# Patient Record
Sex: FEMALE | Race: WHITE | NOT HISPANIC OR LATINO | URBAN - METROPOLITAN AREA
[De-identification: names, ages, dates, MRNs, and addresses within clinical notes are randomized per-mention and may not be internally consistent; named-entity substitution may affect disease eponyms.]

---

## 2017-01-19 ENCOUNTER — HOSPITAL ENCOUNTER (OUTPATIENT)
Dept: RADIOLOGY | Facility: MEDICAL CENTER | Age: 52
End: 2017-01-19
Attending: NURSE PRACTITIONER
Payer: COMMERCIAL

## 2017-01-19 DIAGNOSIS — Z12.39 SCREENING FOR BREAST CANCER: ICD-10-CM

## 2017-01-19 PROCEDURE — 77063 BREAST TOMOSYNTHESIS BI: CPT

## 2017-10-16 ENCOUNTER — OFFICE VISIT (OUTPATIENT)
Dept: MEDICAL GROUP | Facility: MEDICAL CENTER | Age: 52
End: 2017-10-16
Payer: COMMERCIAL

## 2017-10-16 VITALS
SYSTOLIC BLOOD PRESSURE: 140 MMHG | OXYGEN SATURATION: 94 % | TEMPERATURE: 98.1 F | HEIGHT: 67 IN | BODY MASS INDEX: 25.11 KG/M2 | HEART RATE: 84 BPM | RESPIRATION RATE: 16 BRPM | WEIGHT: 160 LBS | DIASTOLIC BLOOD PRESSURE: 82 MMHG

## 2017-10-16 DIAGNOSIS — I10 ESSENTIAL HYPERTENSION: ICD-10-CM

## 2017-10-16 DIAGNOSIS — F41.9 ANXIETY: ICD-10-CM

## 2017-10-16 DIAGNOSIS — Z12.11 SCREEN FOR COLON CANCER: ICD-10-CM

## 2017-10-16 DIAGNOSIS — Z23 INFLUENZA VACCINE NEEDED: ICD-10-CM

## 2017-10-16 DIAGNOSIS — E78.5 DYSLIPIDEMIA: ICD-10-CM

## 2017-10-16 DIAGNOSIS — Z23 NEED FOR PNEUMOCOCCAL VACCINATION: ICD-10-CM

## 2017-10-16 PROCEDURE — 90471 IMMUNIZATION ADMIN: CPT | Performed by: NURSE PRACTITIONER

## 2017-10-16 PROCEDURE — 90686 IIV4 VACC NO PRSV 0.5 ML IM: CPT | Performed by: NURSE PRACTITIONER

## 2017-10-16 PROCEDURE — 90732 PPSV23 VACC 2 YRS+ SUBQ/IM: CPT | Performed by: NURSE PRACTITIONER

## 2017-10-16 PROCEDURE — 99213 OFFICE O/P EST LOW 20 MIN: CPT | Mod: 25 | Performed by: NURSE PRACTITIONER

## 2017-10-16 PROCEDURE — 90472 IMMUNIZATION ADMIN EACH ADD: CPT | Performed by: NURSE PRACTITIONER

## 2017-10-16 RX ORDER — LISINOPRIL AND HYDROCHLOROTHIAZIDE 20; 12.5 MG/1; MG/1
1 TABLET ORAL 2 TIMES DAILY
Qty: 180 TAB | Refills: 3 | Status: SHIPPED | OUTPATIENT
Start: 2017-10-16 | End: 2018-10-29 | Stop reason: SDUPTHER

## 2017-10-16 RX ORDER — LABETALOL 100 MG/1
100 TABLET, FILM COATED ORAL 2 TIMES DAILY
Qty: 180 TAB | Refills: 3 | Status: SHIPPED | OUTPATIENT
Start: 2017-10-16 | End: 2018-10-29 | Stop reason: SDUPTHER

## 2017-10-16 RX ORDER — SIMVASTATIN 20 MG
20 TABLET ORAL EVERY EVENING
Qty: 90 TAB | Refills: 3 | Status: SHIPPED | OUTPATIENT
Start: 2017-10-16 | End: 2018-10-29 | Stop reason: SDUPTHER

## 2017-10-16 ASSESSMENT — PATIENT HEALTH QUESTIONNAIRE - PHQ9: CLINICAL INTERPRETATION OF PHQ2 SCORE: 0

## 2017-10-17 NOTE — PROGRESS NOTES
Subjective:      Elizabeth Suarez is a 51 y.o. female who presents with Medication Refill            HPI Elizabeth Patels here today for yearly physical.      1. Essential hypertension  Patient reports she continues to take her twice a day lisinopril with HCTZ, labetalol twice a day and with this her blood pressure has been controlled. She reports no side effects from the medicine and last blood work showed normal kidney, liver and electrolytes.    2. Anxiety  Patient reports this is well-controlled with her labetalol.    3. Dyslipidemia  Cholesterol levels have been good on low-dose simvastatin in the past but she is due for lab work.    4. Screen for colon cancer  Patient admits she has not yet set up for her colonoscopy.      Social History   Substance Use Topics   • Smoking status: Current Every Day Smoker     Years: 26.00     Types: Cigarettes   • Smokeless tobacco: Never Used      Comment: 1-2 cigs per day,15 yrs   • Alcohol use 8.0 oz/week     16 Glasses of wine per week      Comment: 2-3 per day     Current Outpatient Prescriptions   Medication Sig Dispense Refill   • lisinopril-hydrochlorothiazide (PRINZIDE, ZESTORETIC) 20-12.5 MG per tablet Take 1 Tab by mouth 2 times a day. 180 Tab 3   • simvastatin (ZOCOR) 20 MG Tab Take 1 Tab by mouth every evening. 90 Tab 3   • labetalol (NORMODYNE) 100 MG Tab Take 1 Tab by mouth 2 times a day. 180 Tab 3   • aspirin EC (ECOTRIN) 81 MG TBEC Take 81 mg by mouth every day.       No current facility-administered medications for this visit.      Family History   Problem Relation Age of Onset   • Hypertension Mother      sle   • Diabetes Father    • Heart Failure Father      Past Medical History:   Diagnosis Date   • Hypertension    • Other specified symptom associated with female genital organs        Review of Systems   All other systems reviewed and are negative.         Objective:     /82   Pulse 84   Temp 36.7 °C (98.1 °F)   Resp 16   Ht 1.702 m (5'  "7\")   Wt 72.6 kg (160 lb)   LMP 06/23/2010   SpO2 94%   BMI 25.06 kg/m²      Physical Exam   Constitutional: She is oriented to person, place, and time. She appears well-developed and well-nourished. No distress.   HENT:   Head: Normocephalic and atraumatic.   Right Ear: External ear normal.   Left Ear: External ear normal.   Nose: Nose normal.   Eyes: Right eye exhibits no discharge. Left eye exhibits no discharge.   Neck: Normal range of motion. Neck supple. No thyromegaly present.   Cardiovascular: Normal rate, regular rhythm and normal heart sounds.  Exam reveals no gallop and no friction rub.    No murmur heard.  Pulmonary/Chest: Effort normal and breath sounds normal. She has no wheezes. She has no rales.   Musculoskeletal: She exhibits no edema or tenderness.   Neurological: She is alert and oriented to person, place, and time. She displays normal reflexes.   Skin: Skin is warm and dry. No rash noted. She is not diaphoretic.   Psychiatric: She has a normal mood and affect. Her behavior is normal. Judgment and thought content normal.   Nursing note and vitals reviewed.              Assessment/Plan:     1. Essential hypertension  Patient will do her yearly blood work and follow up if results are abnormal. I recommended that she contact me before her yearly visits so that I can order lab work prior to that visit in the future. Medicines have been refilled.  - COMP METABOLIC PANEL; Future  - CBC WITHOUT DIFFERENTIAL; Future  - lisinopril-hydrochlorothiazide (PRINZIDE, ZESTORETIC) 20-12.5 MG per tablet; Take 1 Tab by mouth 2 times a day.  Dispense: 180 Tab; Refill: 3  - labetalol (NORMODYNE) 100 MG Tab; Take 1 Tab by mouth 2 times a day.  Dispense: 180 Tab; Refill: 3    2. Anxiety  Patient will continue with her labetalol.    3. Dyslipidemia  I will check to see if patient has good cholesterol levels on simvastatin.  - LIPID PROFILE; Future  - simvastatin (ZOCOR) 20 MG Tab; Take 1 Tab by mouth every evening.  " Dispense: 90 Tab; Refill: 3    4. Screen for colon cancer  Patient due for her first colonoscopy.  - REFERRAL TO GI FOR COLONOSCOPY    5. Influenza vaccine needed  I have placed the below orders and discussed them with an approved delegating provider. The MA is performing the below orders under the direction of Dr. harden    - Flu Quad Inj >3 Year Pre-Filled PF    6. Need for pneumococcal vaccination  I have placed the below orders and discussed them with an approved delegating provider. The MA is performing the below orders under the direction of Dr. Harden    - PNEUMOCOCCAL POLYSACCHARIDE VACCINE 23-VALENT =>1YO SQ/IM

## 2017-11-20 ENCOUNTER — HOSPITAL ENCOUNTER (OUTPATIENT)
Dept: LAB | Facility: MEDICAL CENTER | Age: 52
End: 2017-11-20
Attending: NURSE PRACTITIONER
Payer: COMMERCIAL

## 2017-11-20 DIAGNOSIS — I10 ESSENTIAL HYPERTENSION: ICD-10-CM

## 2017-11-20 DIAGNOSIS — E78.5 DYSLIPIDEMIA: ICD-10-CM

## 2017-11-20 LAB
ALBUMIN SERPL BCP-MCNC: 4.5 G/DL (ref 3.2–4.9)
ALBUMIN/GLOB SERPL: 1.6 G/DL
ALP SERPL-CCNC: 39 U/L (ref 30–99)
ALT SERPL-CCNC: 21 U/L (ref 2–50)
ANION GAP SERPL CALC-SCNC: 8 MMOL/L (ref 0–11.9)
AST SERPL-CCNC: 19 U/L (ref 12–45)
BILIRUB SERPL-MCNC: 0.5 MG/DL (ref 0.1–1.5)
BUN SERPL-MCNC: 14 MG/DL (ref 8–22)
CALCIUM SERPL-MCNC: 10.1 MG/DL (ref 8.5–10.5)
CHLORIDE SERPL-SCNC: 107 MMOL/L (ref 96–112)
CHOLEST SERPL-MCNC: 217 MG/DL (ref 100–199)
CO2 SERPL-SCNC: 25 MMOL/L (ref 20–33)
CREAT SERPL-MCNC: 0.7 MG/DL (ref 0.5–1.4)
GFR SERPL CREATININE-BSD FRML MDRD: >60 ML/MIN/1.73 M 2
GLOBULIN SER CALC-MCNC: 2.8 G/DL (ref 1.9–3.5)
GLUCOSE SERPL-MCNC: 79 MG/DL (ref 65–99)
HDLC SERPL-MCNC: 71 MG/DL
LDLC SERPL CALC-MCNC: 126 MG/DL
POTASSIUM SERPL-SCNC: 3.8 MMOL/L (ref 3.6–5.5)
PROT SERPL-MCNC: 7.3 G/DL (ref 6–8.2)
SODIUM SERPL-SCNC: 140 MMOL/L (ref 135–145)
TRIGL SERPL-MCNC: 100 MG/DL (ref 0–149)

## 2017-11-20 PROCEDURE — 80061 LIPID PANEL: CPT

## 2017-11-20 PROCEDURE — 80053 COMPREHEN METABOLIC PANEL: CPT

## 2017-11-20 PROCEDURE — 36415 COLL VENOUS BLD VENIPUNCTURE: CPT

## 2018-08-16 DIAGNOSIS — Z12.11 SCREEN FOR COLON CANCER: ICD-10-CM

## 2018-08-20 ENCOUNTER — HOSPITAL ENCOUNTER (OUTPATIENT)
Dept: RADIOLOGY | Facility: MEDICAL CENTER | Age: 53
End: 2018-08-20
Attending: NURSE PRACTITIONER
Payer: COMMERCIAL

## 2018-08-20 DIAGNOSIS — Z12.31 VISIT FOR SCREENING MAMMOGRAM: ICD-10-CM

## 2018-08-20 PROCEDURE — 77067 SCR MAMMO BI INCL CAD: CPT

## 2018-08-21 ENCOUNTER — NON-PROVIDER VISIT (OUTPATIENT)
Dept: MEDICAL GROUP | Facility: PHYSICIAN GROUP | Age: 53
End: 2018-08-21
Payer: COMMERCIAL

## 2018-08-21 DIAGNOSIS — Z23 NEED FOR TDAP VACCINATION: ICD-10-CM

## 2018-08-21 PROCEDURE — 90715 TDAP VACCINE 7 YRS/> IM: CPT | Performed by: NURSE PRACTITIONER

## 2018-08-21 PROCEDURE — 90471 IMMUNIZATION ADMIN: CPT | Performed by: NURSE PRACTITIONER

## 2018-08-21 NOTE — PROGRESS NOTES
"Elizabeth Suarez is a 52 y.o. female here for a non-provider visit for:   TDAP    Reason for immunization: Overdue/Provider Recommended  Immunization records indicate need for vaccine: Yes, confirmed with Epic and confirmed with [place] [facility]  Minimum interval has been met for this vaccine: Yes  ABN completed: Not Indicated    Order and dose verified by: anat  VIS Dated  2/24/15 was given to patient: Yes  All IAC Questionnaire questions were answered \"No.\"    Patient tolerated injection and no adverse effects were observed or reported: Yes    Pt scheduled for next dose in series: Not Indicated    "

## 2018-10-02 ENCOUNTER — IMMUNIZATION (OUTPATIENT)
Dept: SOCIAL WORK | Facility: CLINIC | Age: 53
End: 2018-10-02
Payer: COMMERCIAL

## 2018-10-02 DIAGNOSIS — Z23 NEED FOR VACCINATION: ICD-10-CM

## 2018-10-02 PROCEDURE — 90471 IMMUNIZATION ADMIN: CPT | Performed by: REGISTERED NURSE

## 2018-10-02 PROCEDURE — 90686 IIV4 VACC NO PRSV 0.5 ML IM: CPT | Performed by: REGISTERED NURSE

## 2018-10-29 DIAGNOSIS — I10 ESSENTIAL HYPERTENSION: ICD-10-CM

## 2018-10-29 DIAGNOSIS — E78.5 DYSLIPIDEMIA: ICD-10-CM

## 2018-10-29 RX ORDER — LISINOPRIL AND HYDROCHLOROTHIAZIDE 20; 12.5 MG/1; MG/1
1 TABLET ORAL 2 TIMES DAILY
Qty: 60 TAB | Refills: 0 | Status: SHIPPED | OUTPATIENT
Start: 2018-10-29 | End: 2018-11-05 | Stop reason: SDUPTHER

## 2018-10-29 RX ORDER — SIMVASTATIN 20 MG
20 TABLET ORAL EVERY EVENING
Qty: 30 TAB | Refills: 0 | Status: SHIPPED | OUTPATIENT
Start: 2018-10-29 | End: 2018-11-05 | Stop reason: SDUPTHER

## 2018-10-29 RX ORDER — LABETALOL 100 MG/1
100 TABLET, FILM COATED ORAL 2 TIMES DAILY
Qty: 60 TAB | Refills: 0 | Status: SHIPPED | OUTPATIENT
Start: 2018-10-29 | End: 2018-11-05 | Stop reason: SDUPTHER

## 2018-11-05 ENCOUNTER — OFFICE VISIT (OUTPATIENT)
Dept: MEDICAL GROUP | Facility: MEDICAL CENTER | Age: 53
End: 2018-11-05
Payer: COMMERCIAL

## 2018-11-05 VITALS
OXYGEN SATURATION: 97 % | RESPIRATION RATE: 16 BRPM | DIASTOLIC BLOOD PRESSURE: 80 MMHG | WEIGHT: 161 LBS | HEIGHT: 67 IN | BODY MASS INDEX: 25.27 KG/M2 | SYSTOLIC BLOOD PRESSURE: 136 MMHG | HEART RATE: 87 BPM

## 2018-11-05 DIAGNOSIS — E78.5 DYSLIPIDEMIA: ICD-10-CM

## 2018-11-05 DIAGNOSIS — I10 ESSENTIAL HYPERTENSION: ICD-10-CM

## 2018-11-05 DIAGNOSIS — K63.5 POLYP OF COLON, UNSPECIFIED PART OF COLON, UNSPECIFIED TYPE: ICD-10-CM

## 2018-11-05 PROCEDURE — 99213 OFFICE O/P EST LOW 20 MIN: CPT | Performed by: NURSE PRACTITIONER

## 2018-11-05 RX ORDER — SIMVASTATIN 20 MG
20 TABLET ORAL EVERY EVENING
Qty: 30 TAB | Refills: 11 | Status: SHIPPED | OUTPATIENT
Start: 2018-11-05 | End: 2019-10-11 | Stop reason: SDUPTHER

## 2018-11-05 RX ORDER — LISINOPRIL AND HYDROCHLOROTHIAZIDE 20; 12.5 MG/1; MG/1
1 TABLET ORAL 2 TIMES DAILY
Qty: 180 TAB | Refills: 3 | Status: SHIPPED | OUTPATIENT
Start: 2018-11-05 | End: 2019-10-11 | Stop reason: SDUPTHER

## 2018-11-05 RX ORDER — LABETALOL 100 MG/1
100 TABLET, FILM COATED ORAL 2 TIMES DAILY
Qty: 180 TAB | Refills: 3 | Status: SHIPPED | OUTPATIENT
Start: 2018-11-05 | End: 2019-10-11 | Stop reason: SDUPTHER

## 2018-11-05 ASSESSMENT — PATIENT HEALTH QUESTIONNAIRE - PHQ9: CLINICAL INTERPRETATION OF PHQ2 SCORE: 0

## 2018-11-06 NOTE — PROGRESS NOTES
Subjective:      Elizabeth Suarez is a 52 y.o. female who presents with Medication Refill (all of them)        CC: Patient here today for yearly follow-up on dyslipidemia and hypertension.    HPI Elizabeth Suarez      1. Dyslipidemia  Patient currently on simvastatin 20 mg and her lab work from a year ago showed very good HDL but continued elevation in LDL and total cholesterol.  She has risk factor of age and hypertension and smoking.    2. Essential hypertension  She reports her blood pressure has been well controlled on lisinopril with HCTZ as well as labetalol which she uses twice a day.  Her last chemistry panel was normal.    3. Polyp of colon, unspecified part of colon, unspecified type  Patient had recent first colonoscopy and was normal except for colon polyps with 5-year follow-up.  Current Outpatient Prescriptions   Medication Sig Dispense Refill   • lisinopril-hydrochlorothiazide (PRINZIDE, ZESTORETIC) 20-12.5 MG per tablet Take 1 Tab by mouth 2 times a day. 180 Tab 3   • simvastatin (ZOCOR) 20 MG Tab Take 1 Tab by mouth every evening. 30 Tab 11   • labetalol (NORMODYNE) 100 MG Tab Take 1 Tab by mouth 2 times a day. 180 Tab 3   • aspirin EC (ECOTRIN) 81 MG TBEC Take 81 mg by mouth every day.       No current facility-administered medications for this visit.      Social History   Substance Use Topics   • Smoking status: Current Every Day Smoker     Years: 26.00     Types: Cigarettes   • Smokeless tobacco: Never Used      Comment: 1-2 cigs per day,15 yrs   • Alcohol use 8.0 oz/week     16 Glasses of wine per week      Comment: 2-3 per day     Past Medical History:   Diagnosis Date   • Hypertension    • Other specified symptom associated with female genital organs      Family History   Problem Relation Age of Onset   • Hypertension Mother         sle   • Diabetes Father    • Heart Failure Father        Review of Systems   All other systems reviewed and are negative.         Objective:     /80  "(BP Location: Right arm, Patient Position: Sitting, BP Cuff Size: Adult)   Pulse 87   Resp 16   Ht 1.702 m (5' 7\")   Wt 73 kg (161 lb)   LMP 06/23/2010   SpO2 97%   BMI 25.22 kg/m²      Physical Exam   Constitutional: She is oriented to person, place, and time. She appears well-developed and well-nourished. No distress.   HENT:   Head: Normocephalic and atraumatic.   Right Ear: External ear normal.   Left Ear: External ear normal.   Nose: Nose normal.   Eyes: Right eye exhibits no discharge. Left eye exhibits no discharge.   Neck: Normal range of motion. Neck supple. No thyromegaly present.   Cardiovascular: Normal rate, regular rhythm and normal heart sounds.  Exam reveals no gallop and no friction rub.    No murmur heard.  Pulmonary/Chest: Effort normal and breath sounds normal. She has no wheezes. She has no rales.   Musculoskeletal: She exhibits no edema or tenderness.   Neurological: She is alert and oriented to person, place, and time. She displays normal reflexes.   Skin: Skin is warm and dry. No rash noted. She is not diaphoretic.   Psychiatric: She has a normal mood and affect. Her behavior is normal. Judgment and thought content normal.   Nursing note and vitals reviewed.              Assessment/Plan:     1. Dyslipidemia  Patient to do lab work and if her LDL is above 100, I am going to recommend switching her to atorvastatin.  - LIPID PROFILE; Future  - simvastatin (ZOCOR) 20 MG Tab; Take 1 Tab by mouth every evening.  Dispense: 30 Tab; Refill: 11    2. Essential hypertension  Blood pressure currently well controlled on her combination medication which she has been on for a while and she is not hypotensive or bradycardic.  - COMP METABOLIC PANEL; Future  - CBC WITH DIFFERENTIAL; Future  - lisinopril-hydrochlorothiazide (PRINZIDE, ZESTORETIC) 20-12.5 MG per tablet; Take 1 Tab by mouth 2 times a day.  Dispense: 180 Tab; Refill: 3  - labetalol (NORMODYNE) 100 MG Tab; Take 1 Tab by mouth 2 times a day. "  Dispense: 180 Tab; Refill: 3    3. Polyp of colon, unspecified part of colon, unspecified type  Patient will be due for repeat colonoscopy in 2023.

## 2018-11-27 ENCOUNTER — HOSPITAL ENCOUNTER (OUTPATIENT)
Dept: LAB | Facility: MEDICAL CENTER | Age: 53
End: 2018-11-27
Attending: NURSE PRACTITIONER
Payer: COMMERCIAL

## 2018-11-27 DIAGNOSIS — E78.5 DYSLIPIDEMIA: ICD-10-CM

## 2018-11-27 DIAGNOSIS — I10 ESSENTIAL HYPERTENSION: ICD-10-CM

## 2018-11-27 LAB
ALBUMIN SERPL BCP-MCNC: 4.6 G/DL (ref 3.2–4.9)
ALBUMIN/GLOB SERPL: 2 G/DL
ALP SERPL-CCNC: 54 U/L (ref 30–99)
ALT SERPL-CCNC: 29 U/L (ref 2–50)
ANION GAP SERPL CALC-SCNC: 10 MMOL/L (ref 0–11.9)
AST SERPL-CCNC: 25 U/L (ref 12–45)
BASOPHILS # BLD AUTO: 0.7 % (ref 0–1.8)
BASOPHILS # BLD: 0.05 K/UL (ref 0–0.12)
BILIRUB SERPL-MCNC: 0.3 MG/DL (ref 0.1–1.5)
BUN SERPL-MCNC: 11 MG/DL (ref 8–22)
CALCIUM SERPL-MCNC: 9.6 MG/DL (ref 8.5–10.5)
CHLORIDE SERPL-SCNC: 105 MMOL/L (ref 96–112)
CHOLEST SERPL-MCNC: 195 MG/DL (ref 100–199)
CO2 SERPL-SCNC: 26 MMOL/L (ref 20–33)
CREAT SERPL-MCNC: 0.68 MG/DL (ref 0.5–1.4)
EOSINOPHIL # BLD AUTO: 0.34 K/UL (ref 0–0.51)
EOSINOPHIL NFR BLD: 4.9 % (ref 0–6.9)
ERYTHROCYTE [DISTWIDTH] IN BLOOD BY AUTOMATED COUNT: 42.5 FL (ref 35.9–50)
FASTING STATUS PATIENT QL REPORTED: NORMAL
GLOBULIN SER CALC-MCNC: 2.3 G/DL (ref 1.9–3.5)
GLUCOSE SERPL-MCNC: 75 MG/DL (ref 65–99)
HCT VFR BLD AUTO: 42.1 % (ref 37–47)
HDLC SERPL-MCNC: 66 MG/DL
HGB BLD-MCNC: 14.4 G/DL (ref 12–16)
IMM GRANULOCYTES # BLD AUTO: 0.01 K/UL (ref 0–0.11)
IMM GRANULOCYTES NFR BLD AUTO: 0.1 % (ref 0–0.9)
LDLC SERPL CALC-MCNC: 101 MG/DL
LYMPHOCYTES # BLD AUTO: 3.03 K/UL (ref 1–4.8)
LYMPHOCYTES NFR BLD: 43.8 % (ref 22–41)
MCH RBC QN AUTO: 33 PG (ref 27–33)
MCHC RBC AUTO-ENTMCNC: 34.2 G/DL (ref 33.6–35)
MCV RBC AUTO: 96.3 FL (ref 81.4–97.8)
MONOCYTES # BLD AUTO: 0.44 K/UL (ref 0–0.85)
MONOCYTES NFR BLD AUTO: 6.4 % (ref 0–13.4)
NEUTROPHILS # BLD AUTO: 3.05 K/UL (ref 2–7.15)
NEUTROPHILS NFR BLD: 44.1 % (ref 44–72)
NRBC # BLD AUTO: 0 K/UL
NRBC BLD-RTO: 0 /100 WBC
PLATELET # BLD AUTO: 216 K/UL (ref 164–446)
PMV BLD AUTO: 10.1 FL (ref 9–12.9)
POTASSIUM SERPL-SCNC: 3.6 MMOL/L (ref 3.6–5.5)
PROT SERPL-MCNC: 6.9 G/DL (ref 6–8.2)
RBC # BLD AUTO: 4.37 M/UL (ref 4.2–5.4)
SODIUM SERPL-SCNC: 141 MMOL/L (ref 135–145)
TRIGL SERPL-MCNC: 140 MG/DL (ref 0–149)
WBC # BLD AUTO: 6.9 K/UL (ref 4.8–10.8)

## 2018-11-27 PROCEDURE — 36415 COLL VENOUS BLD VENIPUNCTURE: CPT

## 2018-11-27 PROCEDURE — 80061 LIPID PANEL: CPT

## 2018-11-27 PROCEDURE — 80053 COMPREHEN METABOLIC PANEL: CPT

## 2018-11-27 PROCEDURE — 85025 COMPLETE CBC W/AUTO DIFF WBC: CPT

## 2019-10-11 ENCOUNTER — OFFICE VISIT (OUTPATIENT)
Dept: MEDICAL GROUP | Facility: MEDICAL CENTER | Age: 54
End: 2019-10-11
Payer: COMMERCIAL

## 2019-10-11 VITALS
HEIGHT: 67 IN | BODY MASS INDEX: 25.43 KG/M2 | TEMPERATURE: 97.4 F | SYSTOLIC BLOOD PRESSURE: 138 MMHG | HEART RATE: 70 BPM | RESPIRATION RATE: 16 BRPM | WEIGHT: 162 LBS | OXYGEN SATURATION: 97 % | DIASTOLIC BLOOD PRESSURE: 72 MMHG

## 2019-10-11 DIAGNOSIS — I10 ESSENTIAL HYPERTENSION: ICD-10-CM

## 2019-10-11 DIAGNOSIS — Z72.0 TOBACCO ABUSE DISORDER: ICD-10-CM

## 2019-10-11 DIAGNOSIS — Z00.00 ROUTINE GENERAL MEDICAL EXAMINATION AT A HEALTH CARE FACILITY: ICD-10-CM

## 2019-10-11 DIAGNOSIS — Z23 INFLUENZA VACCINE NEEDED: ICD-10-CM

## 2019-10-11 DIAGNOSIS — E78.5 DYSLIPIDEMIA: ICD-10-CM

## 2019-10-11 PROCEDURE — 90686 IIV4 VACC NO PRSV 0.5 ML IM: CPT | Performed by: NURSE PRACTITIONER

## 2019-10-11 PROCEDURE — 90471 IMMUNIZATION ADMIN: CPT | Performed by: NURSE PRACTITIONER

## 2019-10-11 PROCEDURE — 99396 PREV VISIT EST AGE 40-64: CPT | Mod: 25 | Performed by: NURSE PRACTITIONER

## 2019-10-11 RX ORDER — LISINOPRIL AND HYDROCHLOROTHIAZIDE 20; 12.5 MG/1; MG/1
1 TABLET ORAL 2 TIMES DAILY
Qty: 180 TAB | Refills: 3 | Status: SHIPPED | OUTPATIENT
Start: 2019-10-11 | End: 2020-11-16 | Stop reason: SDUPTHER

## 2019-10-11 RX ORDER — SIMVASTATIN 20 MG
20 TABLET ORAL EVERY EVENING
Qty: 90 TAB | Refills: 3 | Status: SHIPPED | OUTPATIENT
Start: 2019-10-11 | End: 2020-11-16 | Stop reason: SDUPTHER

## 2019-10-11 RX ORDER — LABETALOL 100 MG/1
100 TABLET, FILM COATED ORAL 2 TIMES DAILY
Qty: 180 TAB | Refills: 3 | Status: SHIPPED | OUTPATIENT
Start: 2019-10-11 | End: 2020-11-16 | Stop reason: SDUPTHER

## 2019-10-11 ASSESSMENT — PATIENT HEALTH QUESTIONNAIRE - PHQ9: CLINICAL INTERPRETATION OF PHQ2 SCORE: 0

## 2019-10-11 NOTE — PROGRESS NOTES
Subjective:      Elizabeth Suarez is a 53 y.o. female who presents with Annual Exam        CC: Patient here today accompanied by her  for annual physical and need of medications for cholesterol and hypertension.    HPI       1. Routine general medical examination at a health care facility  Patient continues to work as a  at the [employer] in [place] where she lives.  She reports no change in status and feels healthy.  She has not needed Pap smear because of previous hysterectomy and is up-to-date on mammograms    2. Dyslipidemia  Patient continues on simvastatin 20 mg which she has taken for quite a while for cholesterol with last HDL at 66 and total cholesterol 195.    3. Essential hypertension  She reports that her blood pressure is been controlled on lisinopril, HCTZ and labetalol.    4. Tobacco abuse disorder  Patient continues to smoke a few cigarettes per day and is not ready to quit.    5. Influenza vaccine needed  Patient due for vaccine  Past Medical History:   Diagnosis Date   • Hypertension    • Other specified symptom associated with female genital organs      Social History     Socioeconomic History   • Marital status:      Spouse name: Not on file   • Number of children: Not on file   • Years of education: Not on file   • Highest education level: Not on file   Occupational History   • Not on file   Social Needs   • Financial resource strain: Not on file   • Food insecurity:     Worry: Not on file     Inability: Not on file   • Transportation needs:     Medical: Not on file     Non-medical: Not on file   Tobacco Use   • Smoking status: Current Every Day Smoker     Years: 26.00     Types: Cigarettes   • Smokeless tobacco: Never Used   • Tobacco comment: 1-2 cigs per day,15 yrs   Substance and Sexual Activity   • Alcohol use: Yes     Alcohol/week: 8.0 oz     Types: 16 Glasses of wine per week     Comment: 2-3 per day   • Drug use: No   • Sexual activity: Yes  "    Partners: Male     Birth control/protection: Surgical   Lifestyle   • Physical activity:     Days per week: Not on file     Minutes per session: Not on file   • Stress: Not on file   Relationships   • Social connections:     Talks on phone: Not on file     Gets together: Not on file     Attends [employer] service: Not on file     Active member of club or organization: Not on file     Attends meetings of clubs or organizations: Not on file     Relationship status: Not on file   • Intimate partner violence:     Fear of current or ex partner: Not on file     Emotionally abused: Not on file     Physically abused: Not on file     Forced sexual activity: Not on file   Other Topics Concern   • Not on file   Social History Narrative   • Not on file     Current Outpatient Medications   Medication Sig Dispense Refill   • lisinopril-hydrochlorothiazide (PRINZIDE, ZESTORETIC) 20-12.5 MG per tablet Take 1 Tab by mouth 2 times a day. 180 Tab 3   • simvastatin (ZOCOR) 20 MG Tab Take 1 Tab by mouth every evening. 90 Tab 3   • labetalol (NORMODYNE) 100 MG Tab Take 1 Tab by mouth 2 times a day. 180 Tab 3   • aspirin EC (ECOTRIN) 81 MG TBEC Take 81 mg by mouth every day.       No current facility-administered medications for this visit.      Family History   Problem Relation Age of Onset   • Hypertension Mother         sle   • Diabetes Father    • Heart Failure Father          Review of Systems   All other systems reviewed and are negative.         Objective:     /72 (BP Location: Right arm, Patient Position: Sitting, BP Cuff Size: Adult)   Pulse 70   Temp 36.3 °C (97.4 °F) (Temporal)   Resp 16   Ht 1.702 m (5' 7\")   Wt 73.5 kg (162 lb)   LMP 06/23/2010   SpO2 97%   BMI 25.37 kg/m²      Physical Exam   Constitutional: She is oriented to person, place, and time. She appears well-developed and well-nourished. No distress.   HENT:   Head: Normocephalic and atraumatic.   Right Ear: External ear normal.   Left Ear: " External ear normal.   Nose: Nose normal.   Eyes: Right eye exhibits no discharge. Left eye exhibits no discharge.   Neck: Normal range of motion. Neck supple. No thyromegaly present.   Cardiovascular: Normal rate, regular rhythm and normal heart sounds. Exam reveals no gallop and no friction rub.   No murmur heard.  Pulmonary/Chest: Effort normal and breath sounds normal. She has no wheezes. She has no rales.   Abdominal: Soft. Bowel sounds are normal. She exhibits no distension and no mass. There is no tenderness. There is no rebound and no guarding.   Musculoskeletal: She exhibits no edema or tenderness.   Neurological: She is alert and oriented to person, place, and time. She displays normal reflexes.   Skin: Skin is warm and dry. No rash noted. She is not diaphoretic.   Psychiatric: She has a normal mood and affect. Her behavior is normal. Judgment and thought content normal.   Nursing note and vitals reviewed.              Assessment/Plan:     1. Routine general medical examination at a health care facility    - Comp Metabolic Panel; Future  - Lipid Profile; Future  - CBC WITHOUT DIFFERENTIAL; Future    2. Dyslipidemia  I will continue patient on simvastatin but explained we may wish to switch to atorvastatin on a future visit.  - Lipid Profile; Future  - simvastatin (ZOCOR) 20 MG Tab; Take 1 Tab by mouth every evening.  Dispense: 90 Tab; Refill: 3    3. Essential hypertension  Blood pressure appears controlled on her 3 medications and she does not feel she needs any changes.  - Comp Metabolic Panel; Future  - lisinopril-hydrochlorothiazide (PRINZIDE, ZESTORETIC) 20-12.5 MG per tablet; Take 1 Tab by mouth 2 times a day.  Dispense: 180 Tab; Refill: 3  - labetalol (NORMODYNE) 100 MG Tab; Take 1 Tab by mouth 2 times a day.  Dispense: 180 Tab; Refill: 3    4. Tobacco abuse disorder  Patient again reminded by myself and her  that she should try quitting all tobacco but she is not ready to do so.    5.  Influenza vaccine needed  I have placed the below orders and discussed them with an approved delegating provider. The MA is performing the below orders under the direction of Dr. Cortes    - Influenza Vaccine Quad Injection (PF)

## 2020-11-16 DIAGNOSIS — E78.5 DYSLIPIDEMIA: ICD-10-CM

## 2020-11-16 DIAGNOSIS — Z00.00 ROUTINE GENERAL MEDICAL EXAMINATION AT A HEALTH CARE FACILITY: ICD-10-CM

## 2020-11-16 DIAGNOSIS — I10 ESSENTIAL HYPERTENSION: ICD-10-CM

## 2020-11-16 RX ORDER — LABETALOL 100 MG/1
100 TABLET, FILM COATED ORAL 2 TIMES DAILY
Qty: 60 TAB | Refills: 0 | Status: SHIPPED | OUTPATIENT
Start: 2020-11-16 | End: 2020-12-14 | Stop reason: SDUPTHER

## 2020-11-16 RX ORDER — LISINOPRIL AND HYDROCHLOROTHIAZIDE 20; 12.5 MG/1; MG/1
1 TABLET ORAL 2 TIMES DAILY
Qty: 60 TAB | Refills: 0 | Status: SHIPPED | OUTPATIENT
Start: 2020-11-16 | End: 2020-12-14 | Stop reason: SDUPTHER

## 2020-11-16 RX ORDER — SIMVASTATIN 20 MG
20 TABLET ORAL EVERY EVENING
Qty: 30 TAB | Refills: 0 | Status: SHIPPED | OUTPATIENT
Start: 2020-11-16 | End: 2020-12-14 | Stop reason: SDUPTHER

## 2020-11-25 ENCOUNTER — HOSPITAL ENCOUNTER (OUTPATIENT)
Dept: LAB | Facility: MEDICAL CENTER | Age: 55
End: 2020-11-25
Attending: NURSE PRACTITIONER
Payer: COMMERCIAL

## 2020-11-25 DIAGNOSIS — Z00.00 ROUTINE GENERAL MEDICAL EXAMINATION AT A HEALTH CARE FACILITY: ICD-10-CM

## 2020-11-25 LAB
ALBUMIN SERPL BCP-MCNC: 4.9 G/DL (ref 3.2–4.9)
ALBUMIN/GLOB SERPL: 2 G/DL
ALP SERPL-CCNC: 58 U/L (ref 30–99)
ALT SERPL-CCNC: 40 U/L (ref 2–50)
ANION GAP SERPL CALC-SCNC: 12 MMOL/L (ref 7–16)
AST SERPL-CCNC: 30 U/L (ref 12–45)
BILIRUB SERPL-MCNC: 0.6 MG/DL (ref 0.1–1.5)
BUN SERPL-MCNC: 7 MG/DL (ref 8–22)
CALCIUM SERPL-MCNC: 10.2 MG/DL (ref 8.5–10.5)
CHLORIDE SERPL-SCNC: 101 MMOL/L (ref 96–112)
CHOLEST SERPL-MCNC: 226 MG/DL (ref 100–199)
CO2 SERPL-SCNC: 24 MMOL/L (ref 20–33)
CREAT SERPL-MCNC: 0.63 MG/DL (ref 0.5–1.4)
ERYTHROCYTE [DISTWIDTH] IN BLOOD BY AUTOMATED COUNT: 42.3 FL (ref 35.9–50)
FASTING STATUS PATIENT QL REPORTED: NORMAL
GLOBULIN SER CALC-MCNC: 2.5 G/DL (ref 1.9–3.5)
GLUCOSE SERPL-MCNC: 82 MG/DL (ref 65–99)
HCT VFR BLD AUTO: 39.9 % (ref 37–47)
HDLC SERPL-MCNC: 107 MG/DL
HGB BLD-MCNC: 14.9 G/DL (ref 12–16)
LDLC SERPL CALC-MCNC: 106 MG/DL
MCH RBC QN AUTO: 37.2 PG (ref 27–33)
MCHC RBC AUTO-ENTMCNC: 37.3 G/DL (ref 33.6–35)
MCV RBC AUTO: 99.5 FL (ref 81.4–97.8)
PLATELET # BLD AUTO: 264 K/UL (ref 164–446)
PMV BLD AUTO: 9.7 FL (ref 9–12.9)
POTASSIUM SERPL-SCNC: 4.1 MMOL/L (ref 3.6–5.5)
PROT SERPL-MCNC: 7.4 G/DL (ref 6–8.2)
RBC # BLD AUTO: 4.01 M/UL (ref 4.2–5.4)
SODIUM SERPL-SCNC: 137 MMOL/L (ref 135–145)
TRIGL SERPL-MCNC: 65 MG/DL (ref 0–149)
WBC # BLD AUTO: 6.5 K/UL (ref 4.8–10.8)

## 2020-11-25 PROCEDURE — 80061 LIPID PANEL: CPT

## 2020-11-25 PROCEDURE — 80053 COMPREHEN METABOLIC PANEL: CPT

## 2020-11-25 PROCEDURE — 85027 COMPLETE CBC AUTOMATED: CPT

## 2020-11-25 PROCEDURE — 36415 COLL VENOUS BLD VENIPUNCTURE: CPT

## 2020-11-30 DIAGNOSIS — D75.89 MACROCYTOSIS: ICD-10-CM

## 2020-12-14 DIAGNOSIS — E78.5 DYSLIPIDEMIA: ICD-10-CM

## 2020-12-14 DIAGNOSIS — I10 ESSENTIAL HYPERTENSION: ICD-10-CM

## 2020-12-14 RX ORDER — SIMVASTATIN 20 MG
20 TABLET ORAL EVERY EVENING
Qty: 30 TAB | Refills: 0 | Status: SHIPPED | OUTPATIENT
Start: 2020-12-14 | End: 2020-12-29 | Stop reason: SDUPTHER

## 2020-12-14 RX ORDER — LISINOPRIL AND HYDROCHLOROTHIAZIDE 20; 12.5 MG/1; MG/1
1 TABLET ORAL 2 TIMES DAILY
Qty: 60 TAB | Refills: 0 | Status: SHIPPED | OUTPATIENT
Start: 2020-12-14 | End: 2020-12-29 | Stop reason: SDUPTHER

## 2020-12-14 RX ORDER — LABETALOL 100 MG/1
100 TABLET, FILM COATED ORAL 2 TIMES DAILY
Qty: 60 TAB | Refills: 0 | Status: SHIPPED | OUTPATIENT
Start: 2020-12-14 | End: 2020-12-29

## 2020-12-22 ENCOUNTER — HOSPITAL ENCOUNTER (OUTPATIENT)
Dept: LAB | Facility: MEDICAL CENTER | Age: 55
End: 2020-12-22
Attending: NURSE PRACTITIONER
Payer: COMMERCIAL

## 2020-12-22 DIAGNOSIS — D75.89 MACROCYTOSIS: ICD-10-CM

## 2020-12-22 LAB
BASOPHILS # BLD AUTO: 0.6 % (ref 0–1.8)
BASOPHILS # BLD: 0.03 K/UL (ref 0–0.12)
EOSINOPHIL # BLD AUTO: 0.21 K/UL (ref 0–0.51)
EOSINOPHIL NFR BLD: 4.4 % (ref 0–6.9)
ERYTHROCYTE [DISTWIDTH] IN BLOOD BY AUTOMATED COUNT: 41.5 FL (ref 35.9–50)
FOLATE SERPL-MCNC: 30 NG/ML
HCT VFR BLD AUTO: 39.7 % (ref 37–47)
HGB BLD-MCNC: 14.7 G/DL (ref 12–16)
IMM GRANULOCYTES # BLD AUTO: 0.01 K/UL (ref 0–0.11)
IMM GRANULOCYTES NFR BLD AUTO: 0.2 % (ref 0–0.9)
IRON SATN MFR SERPL: 35 % (ref 15–55)
IRON SERPL-MCNC: 102 UG/DL (ref 40–170)
LYMPHOCYTES # BLD AUTO: 1.89 K/UL (ref 1–4.8)
LYMPHOCYTES NFR BLD: 39.6 % (ref 22–41)
MCH RBC QN AUTO: 36.8 PG (ref 27–33)
MCHC RBC AUTO-ENTMCNC: 37 G/DL (ref 33.6–35)
MCV RBC AUTO: 99.3 FL (ref 81.4–97.8)
MONOCYTES # BLD AUTO: 0.39 K/UL (ref 0–0.85)
MONOCYTES NFR BLD AUTO: 8.2 % (ref 0–13.4)
NEUTROPHILS # BLD AUTO: 2.24 K/UL (ref 2–7.15)
NEUTROPHILS NFR BLD: 47 % (ref 44–72)
NRBC # BLD AUTO: 0 K/UL
NRBC BLD-RTO: 0 /100 WBC
PLATELET # BLD AUTO: 252 K/UL (ref 164–446)
PMV BLD AUTO: 9.5 FL (ref 9–12.9)
RBC # BLD AUTO: 4 M/UL (ref 4.2–5.4)
TIBC SERPL-MCNC: 290 UG/DL (ref 250–450)
TSH SERPL DL<=0.005 MIU/L-ACNC: 0.92 UIU/ML (ref 0.38–5.33)
UIBC SERPL-MCNC: 188 UG/DL (ref 110–370)
VIT B12 SERPL-MCNC: 778 PG/ML (ref 211–911)
WBC # BLD AUTO: 4.8 K/UL (ref 4.8–10.8)

## 2020-12-22 PROCEDURE — 83550 IRON BINDING TEST: CPT

## 2020-12-22 PROCEDURE — 82607 VITAMIN B-12: CPT

## 2020-12-22 PROCEDURE — 36415 COLL VENOUS BLD VENIPUNCTURE: CPT

## 2020-12-22 PROCEDURE — 83540 ASSAY OF IRON: CPT

## 2020-12-22 PROCEDURE — 84443 ASSAY THYROID STIM HORMONE: CPT

## 2020-12-22 PROCEDURE — 82746 ASSAY OF FOLIC ACID SERUM: CPT

## 2020-12-22 PROCEDURE — 85025 COMPLETE CBC W/AUTO DIFF WBC: CPT

## 2020-12-29 ENCOUNTER — TELEMEDICINE (OUTPATIENT)
Dept: MEDICAL GROUP | Facility: MEDICAL CENTER | Age: 55
End: 2020-12-29
Payer: COMMERCIAL

## 2020-12-29 VITALS
SYSTOLIC BLOOD PRESSURE: 139 MMHG | WEIGHT: 156 LBS | HEIGHT: 66 IN | BODY MASS INDEX: 25.07 KG/M2 | RESPIRATION RATE: 16 BRPM | DIASTOLIC BLOOD PRESSURE: 92 MMHG

## 2020-12-29 DIAGNOSIS — Z12.31 ENCOUNTER FOR SCREENING MAMMOGRAM FOR BREAST CANCER: ICD-10-CM

## 2020-12-29 DIAGNOSIS — I10 ESSENTIAL HYPERTENSION: ICD-10-CM

## 2020-12-29 DIAGNOSIS — D75.89 MACROCYTOSIS: ICD-10-CM

## 2020-12-29 DIAGNOSIS — Z72.0 TOBACCO ABUSE DISORDER: ICD-10-CM

## 2020-12-29 DIAGNOSIS — E78.5 DYSLIPIDEMIA: ICD-10-CM

## 2020-12-29 PROCEDURE — 99213 OFFICE O/P EST LOW 20 MIN: CPT | Mod: 95,CR | Performed by: NURSE PRACTITIONER

## 2020-12-29 RX ORDER — LABETALOL 200 MG/1
200 TABLET, FILM COATED ORAL 2 TIMES DAILY
Qty: 180 TAB | Refills: 3 | Status: SHIPPED | OUTPATIENT
Start: 2020-12-29 | End: 2022-01-06 | Stop reason: SDUPTHER

## 2020-12-29 RX ORDER — SIMVASTATIN 20 MG
20 TABLET ORAL EVERY EVENING
Qty: 90 TAB | Refills: 3 | Status: SHIPPED | OUTPATIENT
Start: 2020-12-29 | End: 2022-01-06 | Stop reason: SDUPTHER

## 2020-12-29 RX ORDER — LISINOPRIL AND HYDROCHLOROTHIAZIDE 20; 12.5 MG/1; MG/1
1 TABLET ORAL 2 TIMES DAILY
Qty: 180 TAB | Refills: 3 | Status: SHIPPED | OUTPATIENT
Start: 2020-12-29 | End: 2022-01-06 | Stop reason: SDUPTHER

## 2020-12-29 ASSESSMENT — FIBROSIS 4 INDEX: FIB4 SCORE: 1.02

## 2020-12-29 ASSESSMENT — PATIENT HEALTH QUESTIONNAIRE - PHQ9: CLINICAL INTERPRETATION OF PHQ2 SCORE: 0

## 2020-12-29 NOTE — PROGRESS NOTES
Virtual Visit: Established Patient   This visit was conducted via Zoom  using secure and encrypted videoconferencing technology. The patient was in a private location in the state of [place].    The patient's identity was confirmed and verbal consent was obtained for this virtual visit.      CC: Patient last seen in October of last year so she was advised that we needed a visit today to refill medicines and review her diagnoses.  She requested a telemedicine visit today from her home.                                                                                                                                      HPI:   Elizabeth Rudolph presents today with the following.    1. Essential hypertension  Patient is currently on a combination of labetalol 100 mg twice a day and lisinopril 20 mg with HCTZ 12.5 mg twice a day.  She states with this her blood pressure is still running above 130/80.  She has had no side effects from medicines.  Liver and kidney functions been good    2. Dyslipidemia  Patient on low-dose simvastatin and her LDL is just above 100 but her HDL is excellent.    3. Macrocytosis  Patient's lab work comes back showing mildly elevated MCV, MCH and MCHC with no anemia.  This is similar to readings in November but lab work from 2018 was normal.  Patient states she feels well and she shows negative for thyroid disease and B12 and folic acid have been normal as well as iron levels.    4. Tobacco abuse disorder  Patient continues to smoke cigarettes    5. Encounter for screening mammogram for breast cancer  Patient due for mammogram      Patient Active Problem List    Diagnosis Date Noted   • Macrocytosis 12/29/2020   • Essential hypertension 08/12/2015   • Anxiety 08/12/2015   • Tobacco abuse disorder 02/20/2013   • S/P hysterectomy 08/15/2012   • Dyslipidemia 02/21/2012       Current Outpatient Medications   Medication Sig Dispense Refill   • labetalol (NORMODYNE) 200 MG Tab Take 1 Tab by mouth 2 times a  "day. 180 Tab 3   • lisinopril-hydrochlorothiazide (PRINZIDE) 20-12.5 MG per tablet Take 1 Tab by mouth 2 times a day. 180 Tab 3   • simvastatin (ZOCOR) 20 MG Tab Take 1 Tab by mouth every evening. 90 Tab 3   • aspirin EC (ECOTRIN) 81 MG TBEC Take 81 mg by mouth every day.       No current facility-administered medications for this visit.          Allergies as of 12/29/2020 - Reviewed 12/29/2020   Allergen Reaction Noted   • Nkda [no known drug allergy]  07/21/2010        ROS:  Denies, chest pain, Shortness of breath, Edema.  No pertinent positives.    /92   Resp 16   Ht 1.676 m (5' 6\")   Wt 70.8 kg (156 lb)   LMP 06/23/2010   BMI 25.18 kg/m²       Physical Exam:  Constitutional: Alert, no distress, well-groomed.  Skin: No rashes in visible areas.  Eye: Round. Conjunctiva clear, lNo icterus.   ENMT: Lips pink without lesions, good dentition, moist mucous membranes. Phonation normal.  Neck: No masses, no thyromegaly. Moves freely without pain.  CV: Pulse as reported by patient  Respiratory: Unlabored respiratory effort, no cough or audible wheeze  Psych: Alert and oriented x3, normal affect and mood.          Assessment and Plan.   55 y.o. female with the following issues.    1. Essential hypertension  I will increase patient's labetalol to 200 mg twice a day to try to get her blood pressure closer to 130/80 without dizziness.  I advised her if her pulse gets too slow or she becomes dizzy, we will need to go back to the overdosage discuss other changes.  - labetalol (NORMODYNE) 200 MG Tab; Take 1 Tab by mouth 2 times a day.  Dispense: 180 Tab; Refill: 3  - lisinopril-hydrochlorothiazide (PRINZIDE) 20-12.5 MG per tablet; Take 1 Tab by mouth 2 times a day.  Dispense: 180 Tab; Refill: 3    2. Dyslipidemia  HDL cholesterol is excellent and above 100 with just slightly elevated LDL  - simvastatin (ZOCOR) 20 MG Tab; Take 1 Tab by mouth every evening.  Dispense: 90 Tab; Refill: 3    3. Macrocytosis  I will have " patient do a hemoglobinopathy profile with recent normal B12, folic acid and TSH with no iron deficiency and no anemia.  - HEMOGLOBINOPATHY PROFILE; Future    4. Tobacco abuse disorder  Patient states she continues to smoke about 2 cigarettes a day    5. Encounter for screening mammogram for breast cancer    - MA-SCREENING MAMMO BILAT W/TOMOSYNTHESIS W/CAD; Future

## 2021-01-05 ENCOUNTER — HOSPITAL ENCOUNTER (OUTPATIENT)
Dept: LAB | Facility: MEDICAL CENTER | Age: 56
End: 2021-01-05
Attending: NURSE PRACTITIONER
Payer: COMMERCIAL

## 2021-01-05 DIAGNOSIS — D75.89 MACROCYTOSIS: ICD-10-CM

## 2021-01-05 PROCEDURE — 36415 COLL VENOUS BLD VENIPUNCTURE: CPT

## 2021-01-05 PROCEDURE — 83021 HEMOGLOBIN CHROMOTOGRAPHY: CPT

## 2021-01-07 LAB
HGB A1 MFR BLD: 97.1 % (ref 95–97.9)
HGB A2 MFR BLD: 2.6 % (ref 2–3.5)
HGB C MFR BLD: 0 % (ref 0–0)
HGB E MFR BLD: 0 % (ref 0–0)
HGB F MFR BLD: 0.3 % (ref 0–2.1)
HGB FRACT BLD ELPH-IMP: NORMAL
HGB OTHER MFR BLD: 0 % (ref 0–0)
HGB S BLD QL SOLY: NORMAL
HGB S MFR BLD: 0 % (ref 0–0)
PATH INTERP BLD-IMP: NORMAL

## 2021-01-12 ENCOUNTER — HOSPITAL ENCOUNTER (OUTPATIENT)
Dept: RADIOLOGY | Facility: MEDICAL CENTER | Age: 56
End: 2021-01-12
Payer: COMMERCIAL

## 2021-01-22 ENCOUNTER — HOSPITAL ENCOUNTER (OUTPATIENT)
Dept: RADIOLOGY | Facility: MEDICAL CENTER | Age: 56
End: 2021-01-22
Attending: NURSE PRACTITIONER
Payer: COMMERCIAL

## 2021-01-22 DIAGNOSIS — Z12.31 ENCOUNTER FOR SCREENING MAMMOGRAM FOR BREAST CANCER: ICD-10-CM

## 2021-01-22 PROCEDURE — 77063 BREAST TOMOSYNTHESIS BI: CPT

## 2022-01-06 ENCOUNTER — OFFICE VISIT (OUTPATIENT)
Dept: MEDICAL GROUP | Facility: PHYSICIAN GROUP | Age: 57
End: 2022-01-06
Payer: COMMERCIAL

## 2022-01-06 VITALS
DIASTOLIC BLOOD PRESSURE: 80 MMHG | HEART RATE: 80 BPM | SYSTOLIC BLOOD PRESSURE: 130 MMHG | WEIGHT: 167.6 LBS | HEIGHT: 66 IN | BODY MASS INDEX: 26.93 KG/M2 | RESPIRATION RATE: 16 BRPM | TEMPERATURE: 96 F | OXYGEN SATURATION: 96 %

## 2022-01-06 DIAGNOSIS — E78.5 DYSLIPIDEMIA: ICD-10-CM

## 2022-01-06 DIAGNOSIS — Z76.89 ENCOUNTER TO ESTABLISH CARE: ICD-10-CM

## 2022-01-06 DIAGNOSIS — Z13.21 ENCOUNTER FOR VITAMIN DEFICIENCY SCREENING: ICD-10-CM

## 2022-01-06 DIAGNOSIS — Z72.0 TOBACCO ABUSE DISORDER: ICD-10-CM

## 2022-01-06 DIAGNOSIS — D75.89 MACROCYTOSIS: ICD-10-CM

## 2022-01-06 DIAGNOSIS — I10 ESSENTIAL HYPERTENSION: ICD-10-CM

## 2022-01-06 DIAGNOSIS — Z12.31 ENCOUNTER FOR SCREENING MAMMOGRAM FOR BREAST CANCER: ICD-10-CM

## 2022-01-06 DIAGNOSIS — B00.1 RECURRENT COLD SORES: ICD-10-CM

## 2022-01-06 PROBLEM — Z13.6 SCREENING FOR CARDIOVASCULAR CONDITION: Status: RESOLVED | Noted: 2022-01-06 | Resolved: 2022-01-06

## 2022-01-06 PROBLEM — Z13.6 SCREENING FOR CARDIOVASCULAR CONDITION: Status: ACTIVE | Noted: 2022-01-06

## 2022-01-06 PROCEDURE — 99214 OFFICE O/P EST MOD 30 MIN: CPT | Performed by: NURSE PRACTITIONER

## 2022-01-06 RX ORDER — LISINOPRIL AND HYDROCHLOROTHIAZIDE 20; 12.5 MG/1; MG/1
1 TABLET ORAL 2 TIMES DAILY
Qty: 180 TABLET | Refills: 3 | Status: SHIPPED | OUTPATIENT
Start: 2022-01-06 | End: 2022-01-11 | Stop reason: SDUPTHER

## 2022-01-06 RX ORDER — SIMVASTATIN 20 MG
20 TABLET ORAL EVERY EVENING
Qty: 90 TABLET | Refills: 3 | Status: SHIPPED | OUTPATIENT
Start: 2022-01-06 | End: 2023-01-31 | Stop reason: SDUPTHER

## 2022-01-06 RX ORDER — LABETALOL 200 MG/1
200 TABLET, FILM COATED ORAL 2 TIMES DAILY
Qty: 180 TABLET | Refills: 3 | Status: SHIPPED | OUTPATIENT
Start: 2022-01-06 | End: 2022-01-11 | Stop reason: SDUPTHER

## 2022-01-06 ASSESSMENT — FIBROSIS 4 INDEX: FIB4 SCORE: 1.04

## 2022-01-06 ASSESSMENT — PATIENT HEALTH QUESTIONNAIRE - PHQ9
CLINICAL INTERPRETATION OF PHQ2 SCORE: 0
5. POOR APPETITE OR OVEREATING: 0 - NOT AT ALL

## 2022-01-06 NOTE — ASSESSMENT & PLAN NOTE
This is a newly assessed condition.  During physical assessment patient was seen to have cold sore on left lower lip.  Patient reports she does not get frequent cold sores however she was recently in [place] and has been working out in the sun which typically causes her cold sores.  Patient denies being bothered by her cold sores and denies wanting any treatment at this time.  We will continue to monitor future appointments.

## 2022-01-06 NOTE — ASSESSMENT & PLAN NOTE
This is a chronic and uncontrolled condition.  Patient's blood pressure today in clinic is 146/90.  Patient is currently taking labetalol, lisinopril/hydrochlorothiazide for blood pressure management and does report taking it daily.  Today she reports that she is recently got back from [place] and was drinking and smoking cigarettes more frequently than normal.    In-depth conversation with patient in regards to correlation with cigarette smoking and alcohol use and elevated blood pressure readings.    Patient is requesting refill of her medications today.  I do feel this is appropriate and refills have been sent to pharmacy.  Patient will come back in 1 to 2 weeks for MA visit for recheck blood pressure readings.  If blood pressure reading still not at goal patient will schedule follow-up appointment for medication management for hypertension.  Discussed the importance of appropriate diet lifestyle modifications to help control blood pressures.

## 2022-01-06 NOTE — ASSESSMENT & PLAN NOTE
Chronic condition.  Patient is due for labs.  Labs have been ordered as indicated below.  Patient will obtain labs in the next 1 to 2 weeks.  Patient is requesting refill of simvastatin 20 mg tablet daily.  I do feel this is appropriate refill sent to pharmacy.  She reports she is tolerating simvastatin well and denies any myalgias.    Discussed appropriate diet lifestyle modifications to help decrease cholesterol levels.

## 2022-01-06 NOTE — PROGRESS NOTES
Chief Complaint   Patient presents with   • New Patient     Est care    • Medication Refill     All medications        Subjective:     HPI:   Elizabeth Suarez is a 56 y.o. female here to discuss the evaluation and management of:      Tobacco abuse disorder  This is a chronic and ongoing health concern.  Patient is aware of the risks associated with smoking cigarettes.  Discussed smoking cessation with her today however she declines wanting to quit.  Encouraged her to continue to work on cutting down and eventually quitting if she is able.    Essential hypertension  This is a chronic and uncontrolled condition.  Patient's blood pressure today in clinic is 146/90.  Patient is currently taking labetalol, lisinopril/hydrochlorothiazide for blood pressure management and does report taking it daily.  Today she reports that she is recently got back from [place] and was drinking and smoking cigarettes more frequently than normal.    In-depth conversation with patient in regards to correlation with cigarette smoking and alcohol use and elevated blood pressure readings.    Patient is requesting refill of her medications today.  I do feel this is appropriate and refills have been sent to pharmacy.  Patient will come back in 1 to 2 weeks for MA visit for recheck blood pressure readings.  If blood pressure reading still not at goal patient will schedule follow-up appointment for medication management for hypertension.  Discussed the importance of appropriate diet lifestyle modifications to help control blood pressures.    Dyslipidemia  Chronic condition.  Patient is due for labs.  Labs have been ordered as indicated below.  Patient will obtain labs in the next 1 to 2 weeks.  Patient is requesting refill of simvastatin 20 mg tablet daily.  I do feel this is appropriate refill sent to pharmacy.  She reports she is tolerating simvastatin well and denies any myalgias.    Discussed appropriate diet lifestyle modifications to help  decrease cholesterol levels.    Recurrent cold sores  This is a newly assessed condition.  During physical assessment patient was seen to have cold sore on left lower lip.  Patient reports she does not get frequent cold sores however she was recently in [place] and has been working out in the sun which typically causes her cold sores.  Patient denies being bothered by her cold sores and denies wanting any treatment at this time.  We will continue to monitor future appointments.      ROS:  Gen: no fevers/chills, no changes in weight  Eyes: no changes in vision  ENT: no sore throat, no hearing loss, no bloody nose  Pulm: no sob, no cough  CV: no chest pain, no palpitations  GI: no nausea/vomiting, no diarrhea  MSk: no myalgias  Skin: no rash  Neuro: no headaches, no numbness/tingling      Allergies   Allergen Reactions   • Nkda [No Known Drug Allergy]        Current medicines (including changes today)  Current Outpatient Medications   Medication Sig Dispense Refill   • labetalol (NORMODYNE) 200 MG Tab Take 1 Tab by mouth 2 times a day. 180 Tab 3   • lisinopril-hydrochlorothiazide (PRINZIDE) 20-12.5 MG per tablet Take 1 Tab by mouth 2 times a day. 180 Tab 3   • simvastatin (ZOCOR) 20 MG Tab Take 1 Tab by mouth every evening. 90 Tab 3   • aspirin EC (ECOTRIN) 81 MG TBEC Take 81 mg by mouth every day.       No current facility-administered medications for this visit.       Social History     Tobacco Use   • Smoking status: Current Every Day Smoker     Years: 26.00     Types: Cigarettes   • Smokeless tobacco: Never Used   • Tobacco comment: 1-2 cigs per day,15 yrs   Vaping Use   • Vaping Use: Never used   Substance Use Topics   • Alcohol use: Yes     Alcohol/week: 8.0 oz     Types: 16 Glasses of wine per week     Comment: 2-3 per day   • Drug use: No       Patient Active Problem List    Diagnosis Date Noted   • Recurrent cold sores 01/06/2022   • Macrocytosis 12/29/2020   • Essential hypertension 08/12/2015   • Anxiety  08/12/2015   • Tobacco abuse disorder 02/20/2013   • S/P hysterectomy 08/15/2012   • Dyslipidemia 02/21/2012       Family History   Problem Relation Age of Onset   • Hypertension Mother         sle   • Heart murmur Mother    • Heart Attack Mother    • Diabetes Father    • Heart Failure Father    • Hypertension Father    • Lupus Father    • Pulmonary Embolism Father    • No Known Problems Sister    • No Known Problems Brother    • Kidney Disease Maternal Grandfather    • COPD Paternal Grandmother    • Lupus Paternal Grandfather           Objective:     No visits with results within 1 Month(s) from this visit.   Latest known visit with results is:   Hospital Outpatient Visit on 01/05/2021   Component Date Value Ref Range Status   • Hemoglobin A1 01/05/2021 97.1  95.0 - 97.9 % Final   • Hemoglobin A2 01/05/2021 2.6  2.0 - 3.5 % Final   • Hemoglobin F 01/05/2021 0.3  0.0 - 2.1 % Final    Comment: REFERENCE INTERVAL: Hemoglobin F  Access complete set of age- and/or gender-specific reference  intervals for this test in the [facility] Test Directory  ([email or web address]).     • Hemoglobin S 01/05/2021 0.0  0.0 - 0.0 % Final   • Hemaglobin C 01/05/2021 0.0  0.0 - 0.0 % Final   • Hemoglobin E 01/05/2021 0.0  0.0 - 0.0 % Final   • Hemoglobin Other 01/05/2021 0.0  0.0 - 0.0 % Final   • Hemoglobin Eval 01/05/2021 See Note   Final    Comment: Impression: Normal HPLC evaluation.  This normal HPLC result does not rule out the possibility of alpha  globin gene deletions associated with silent carrier status or  alpha thalassemia trait. Individuals who carry a rare, Greek beta  thalassemia variant often have a normal Hb A2 and may not be  identified by this assay. Please correlate with clinical and  laboratory findings.     • Hemoglobin,Capillary Electrophores* 01/05/2021 Not Performed   Final    Comment: Performed By: [facility]  [address] [place] [id number]  : Laura Farley MD     • Hgb  "Solubility 01/05/2021 Not Performed   Final       /80 (BP Location: Left arm, Patient Position: Sitting, BP Cuff Size: Adult)   Pulse 80   Temp (!) 35.6 °C (96 °F) (Temporal)   Resp 16   Ht 1.676 m (5' 6\")   Wt 76 kg (167 lb 9.6 oz)   SpO2 96%  Body mass index is 27.05 kg/m².    Physical Exam:  Constitutional: Well-developed and well-nourished female in NAD. Not diaphoretic. No distress.   Skin: warm, dry, intact, no evidence of rash.  Healing herpes simplex 1 lesion on left lower lip.  Head: Atraumatic without lesions.  Eyes: Conjunctivae and extraocular motions are normal. Pupils are equal, round, and reactive to light. No scleral icterus.   Ears:  External ears unremarkable. TMs normal; bilaterally  Mouth/Throat: Tongue normal. Oropharynx is clear and moist. Posterior pharynx without erythema or exudates.  Neck: Supple, trachea midline. No thyromegaly present. No cervical or supraclavicular lymphadenopathy.  Cardiovascular: Regular rate and rhythm without murmur. Radial pulses are intact and equal bilaterally.  Pulmonary: Clear to ausculation. Normal effort. No rales, ronchi, or wheezing.  Abdomen: Soft, non tender, and without distention. Active bowel sounds in all four quadrants. No rebound, guarding, masses   Extremities: No cyanosis, clubbing, erythema, nor edema.   Neurological: Alert and oriented x 3. No cranial nerve deficit. 5/5 myotomes. Sensation intact.   Psychiatric:  Behavior, mood, and affect are appropriate.    Assessment and Plan:     The following treatment plan was discussed:      1. Encounter to establish care  Very pleasant 56-year-old female presents today to establish care.  She denies any concerns or complaints today and indicates that she feels well.  I reviewed and updated her medical history, surgical history, family history, medications, allergies, and social terms of health.    I have reviewed care gaps with patient and she is due for colorectal cancer screening in October " 2023.  She is due in the end of January for mammogram in order was placed today.  Patient reports that influenza vaccine was given earlier this fall.  She is due for Covid Booster.  Encourage patient to get Covid booster as soon as she is able.    2. Macrocytosis  - CBC WITH DIFFERENTIAL; Future    3. Essential hypertension  Chronic and uncontrolled condition.  Refill of medications was sent to pharmacy.  Discussed importance of diet lifestyle modifications including quitting smoking and decreasing her alcohol content.  Patient will have follow-up in 1 to 2 weeks with MA visit for recheck blood pressure.  - Comp Metabolic Panel; Future    4. Dyslipidemia  Chronic condition.  Patient is due for labs.  Labs ordered as indicated below.  Refill of simvastatin was sent to pharmacy.  We will continue to monitor future appointments.  Diet lifestyle modifications discussed.  - Lipid Profile; Future    5. Encounter for vitamin deficiency screening  - VITAMIN D,25 HYDROXY; Future    6. Encounter for screening mammogram for breast cancer  - MA-SCREENING MAMMO BILAT W/TOMOSYNTHESIS W/CAD; Future    7. Tobacco abuse disorder    1. Encounter to establish care    2. Essential hypertension  - Comp Metabolic Panel; Future    3. Dyslipidemia  - Lipid Profile; Future    4. Macrocytosis  - CBC WITH DIFFERENTIAL; Future    5. Encounter for vitamin deficiency screening  - VITAMIN D,25 HYDROXY; Future    6. Encounter for screening mammogram for breast cancer  - MA-SCREENING MAMMO BILAT W/TOMOSYNTHESIS W/CAD; Future    7. Tobacco abuse disorder    8. Recurrent cold sores        Any change or worsening of signs or symptoms, patient encouraged to follow-up or report to emergency room for further evaluation. Patient verbalizes understanding and agrees.    Follow-Up: Return in about 1 year (around 1/6/2023) for Follow up labs.      PLEASE NOTE: This dictation was created using voice recognition software. I have made every reasonable attempt to  correct obvious errors, but I expect that there are errors of grammar and possibly content that I did not discover before finalizing the note.

## 2022-01-06 NOTE — PATIENT INSTRUCTIONS
"Managing Your Hypertension  Hypertension is commonly called high blood pressure. This is when the force of your blood pressing against the walls of your arteries is too strong. Arteries are blood vessels that carry blood from your heart throughout your body. Hypertension forces the heart to work harder to pump blood, and may cause the arteries to become narrow or stiff. Having untreated or uncontrolled hypertension can cause heart attack, stroke, kidney disease, and other problems.  What are blood pressure readings?  A blood pressure reading consists of a higher number over a lower number. Ideally, your blood pressure should be below 120/80. The first (\"top\") number is called the systolic pressure. It is a measure of the pressure in your arteries as your heart beats. The second (\"bottom\") number is called the diastolic pressure. It is a measure of the pressure in your arteries as the heart relaxes.  What does my blood pressure reading mean?  Blood pressure is classified into four stages. Based on your blood pressure reading, your health care provider may use the following stages to determine what type of treatment you need, if any. Systolic pressure and diastolic pressure are measured in a unit called mm Hg.  Normal  · Systolic pressure: below 120.  · Diastolic pressure: below 80.  Elevated  · Systolic pressure: 120-129.  · Diastolic pressure: below 80.  Hypertension stage 1  · Systolic pressure: 130-139.  · Diastolic pressure: 80-89.  Hypertension stage 2  · Systolic pressure: 140 or above.  · Diastolic pressure: 90 or above.  What health risks are associated with hypertension?  Managing your hypertension is an important responsibility. Uncontrolled hypertension can lead to:  · A heart attack.  · A stroke.  · A weakened blood vessel (aneurysm).  · Heart failure.  · Kidney damage.  · Eye damage.  · Metabolic syndrome.  · Memory and concentration problems.  What changes can I make to manage my " hypertension?  Hypertension can be managed by making lifestyle changes and possibly by taking medicines. Your health care provider will help you make a plan to bring your blood pressure within a normal range.  Eating and drinking    · Eat a diet that is high in fiber and potassium, and low in salt (sodium), added sugar, and fat. An example eating plan is called the DASH (Dietary Approaches to Stop Hypertension) diet. To eat this way:  ? Eat plenty of fresh fruits and vegetables. Try to fill half of your plate at each meal with fruits and vegetables.  ? Eat whole grains, such as whole wheat pasta, brown rice, or whole grain bread. Fill about one quarter of your plate with whole grains.  ? Eat low-fat diary products.  ? Avoid fatty cuts of meat, processed or cured meats, and poultry with skin. Fill about one quarter of your plate with lean proteins such as fish, chicken without skin, beans, eggs, and tofu.  ? Avoid premade and processed foods. These tend to be higher in sodium, added sugar, and fat.  · Reduce your daily sodium intake. Most people with hypertension should eat less than 1,500 mg of sodium a day.  · Limit alcohol intake to no more than 1 drink a day for nonpregnant women and 2 drinks a day for men. One drink equals 12 oz of beer, 5 oz of wine, or 1½ oz of hard liquor.  Lifestyle  · Work with your health care provider to maintain a healthy body weight, or to lose weight. Ask what an ideal weight is for you.  · Get at least 30 minutes of exercise that causes your heart to beat faster (aerobic exercise) most days of the week. Activities may include walking, swimming, or biking.  · Include exercise to strengthen your muscles (resistance exercise), such as weight lifting, as part of your weekly exercise routine. Try to do these types of exercises for 30 minutes at least 3 days a week.  · Do not use any products that contain nicotine or tobacco, such as cigarettes and e-cigarettes. If you need help quitting,  ask your health care provider.  · Control any long-term (chronic) conditions you have, such as high cholesterol or diabetes.  Monitoring  · Monitor your blood pressure at home as told by your health care provider. Your personal target blood pressure may vary depending on your medical conditions, your age, and other factors.  · Have your blood pressure checked regularly, as often as told by your health care provider.  Working with your health care provider  · Review all the medicines you take with your health care provider because there may be side effects or interactions.  · Talk with your health care provider about your diet, exercise habits, and other lifestyle factors that may be contributing to hypertension.  · Visit your health care provider regularly. Your health care provider can help you create and adjust your plan for managing hypertension.  Will I need medicine to control my blood pressure?  Your health care provider may prescribe medicine if lifestyle changes are not enough to get your blood pressure under control, and if:  · Your systolic blood pressure is 130 or higher.  · Your diastolic blood pressure is 80 or higher.  Take medicines only as told by your health care provider. Follow the directions carefully. Blood pressure medicines must be taken as prescribed. The medicine does not work as well when you skip doses. Skipping doses also puts you at risk for problems.  Contact a health care provider if:  · You think you are having a reaction to medicines you have taken.  · You have repeated (recurrent) headaches.  · You feel dizzy.  · You have swelling in your ankles.  · You have trouble with your vision.  Get help right away if:  · You develop a severe headache or confusion.  · You have unusual weakness or numbness, or you feel faint.  · You have severe pain in your chest or abdomen.  · You vomit repeatedly.  · You have trouble breathing.  Summary  · Hypertension is when the force of blood pumping  through your arteries is too strong. If this condition is not controlled, it may put you at risk for serious complications.  · Your personal target blood pressure may vary depending on your medical conditions, your age, and other factors. For most people, a normal blood pressure is less than 120/80.  · Hypertension is managed by lifestyle changes, medicines, or both. Lifestyle changes include weight loss, eating a healthy, low-sodium diet, exercising more, and limiting alcohol.  This information is not intended to replace advice given to you by your health care provider. Make sure you discuss any questions you have with your health care provider.  Document Released: 09/11/2013 Document Revised: 04/10/2020 Document Reviewed: 11/15/2017  [employer] Patient Education © 2020 [employer]      Smoking Cessation, Tips for Success  If you are ready to quit smoking, congratulations! You have chosen to help yourself be healthier. Cigarettes bring nicotine, tar, carbon monoxide, and other irritants into your body. Your lungs, heart, and blood vessels will be able to work better without these poisons. There are many different ways to quit smoking. Nicotine gum, nicotine patches, a nicotine inhaler, or nicotine nasal spray can help with physical craving. Hypnosis, support groups, and medicines help break the habit of smoking.  WHAT THINGS CAN I DO TO MAKE QUITTING EASIER?   Here are some tips to help you quit for good:  · Pick a date when you will quit smoking completely. Tell all of your friends and family about your plan to quit on that date.  · Do not try to slowly cut down on the number of cigarettes you are smoking. Pick a quit date and quit smoking completely starting on that day.  · Throw away all cigarettes.    · Clean and remove all ashtrays from your home, work, and car.  · On a card, write down your reasons for quitting. Carry the card with you and read it when you get the urge to smoke.  · Cleanse your body of  "nicotine. Drink enough water and fluids to keep your urine clear or pale yellow. Do this after quitting to flush the nicotine from your body.  · Learn to predict your moods. Do not let a bad situation be your excuse to have a cigarette. Some situations in your life might tempt you into wanting a cigarette.  · Never have \"just one\" cigarette. It leads to wanting another and another. Remind yourself of your decision to quit.  · Change habits associated with smoking. If you smoked while driving or when feeling stressed, try other activities to replace smoking. Stand up when drinking your coffee. Brush your teeth after eating. Sit in a different chair when you read the paper. Avoid alcohol while trying to quit, and try to drink fewer caffeinated beverages. Alcohol and caffeine may urge you to smoke.  · Avoid foods and drinks that can trigger a desire to smoke, such as sugary or spicy foods and alcohol.  · Ask people who smoke not to smoke around you.  · Have something planned to do right after eating or having a cup of coffee. For example, plan to take a walk or exercise.  · Try a relaxation exercise to calm you down and decrease your stress. Remember, you may be tense and nervous for the first 2 weeks after you quit, but this will pass.  · Find new activities to keep your hands busy. Play with a pen, coin, or rubber band. Doodle or draw things on paper.  · Brush your teeth right after eating. This will help cut down on the craving for the taste of tobacco after meals. You can also try mouthwash.    · Use oral substitutes in place of cigarettes. Try using lemon drops, carrots, cinnamon sticks, or chewing gum. Keep them handy so they are available when you have the urge to smoke.  · When you have the urge to smoke, try deep breathing.  · Designate your home as a nonsmoking area.  · If you are a heavy smoker, ask your health care provider about a prescription for nicotine chewing gum. It can ease your withdrawal from " "nicotine.  · Reward yourself. Set aside the cigarette money you save and buy yourself something nice.  · Look for support from others. Join a support group or smoking cessation program. Ask someone at home or at work to help you with your plan to quit smoking.  · Always ask yourself, \"Do I need this cigarette or is this just a reflex?\" Tell yourself, \"Today, I choose not to smoke,\" or \"I do not want to smoke.\" You are reminding yourself of your decision to quit.  · Do not replace cigarette smoking with electronic cigarettes (commonly called e-cigarettes). The safety of e-cigarettes is unknown, and some may contain harmful chemicals.  · If you relapse, do not give up! Plan ahead and think about what you will do the next time you get the urge to smoke.  HOW WILL I FEEL WHEN I QUIT SMOKING?  You may have symptoms of withdrawal because your body is used to nicotine (the addictive substance in cigarettes). You may crave cigarettes, be irritable, feel very hungry, cough often, get headaches, or have difficulty concentrating. The withdrawal symptoms are only temporary. They are strongest when you first quit but will go away within 10-14 days. When withdrawal symptoms occur, stay in control. Think about your reasons for quitting. Remind yourself that these are signs that your body is healing and getting used to being without cigarettes. Remember that withdrawal symptoms are easier to treat than the major diseases that smoking can cause.   Even after the withdrawal is over, expect periodic urges to smoke. However, these cravings are generally short lived and will go away whether you smoke or not. Do not smoke!  WHAT RESOURCES ARE AVAILABLE TO HELP ME QUIT SMOKING?  Your health care provider can direct you to community resources or hospitals for support, which may include:  · Group support.  · Education.  · Hypnosis.  · Therapy.     This information is not intended to replace advice given to you by your health care provider. " "Make sure you discuss any questions you have with your health care provider.     Document Released: 09/15/2005 Document Revised: 01/08/2016 Document Reviewed: 06/05/2014  [employer] Patient Education ©2016 [employer]      Managing Your Hypertension  Hypertension is commonly called high blood pressure. This is when the force of your blood pressing against the walls of your arteries is too strong. Arteries are blood vessels that carry blood from your heart throughout your body. Hypertension forces the heart to work harder to pump blood, and may cause the arteries to become narrow or stiff. Having untreated or uncontrolled hypertension can cause heart attack, stroke, kidney disease, and other problems.  What are blood pressure readings?  A blood pressure reading consists of a higher number over a lower number. Ideally, your blood pressure should be below 120/80. The first (\"top\") number is called the systolic pressure. It is a measure of the pressure in your arteries as your heart beats. The second (\"bottom\") number is called the diastolic pressure. It is a measure of the pressure in your arteries as the heart relaxes.  What does my blood pressure reading mean?  Blood pressure is classified into four stages. Based on your blood pressure reading, your health care provider may use the following stages to determine what type of treatment you need, if any. Systolic pressure and diastolic pressure are measured in a unit called mm Hg.  Normal  · Systolic pressure: below 120.  · Diastolic pressure: below 80.  Elevated  · Systolic pressure: 120-129.  · Diastolic pressure: below 80.  Hypertension stage 1  · Systolic pressure: 130-139.  · Diastolic pressure: 80-89.  Hypertension stage 2  · Systolic pressure: 140 or above.  · Diastolic pressure: 90 or above.  What health risks are associated with hypertension?  Managing your hypertension is an important responsibility. Uncontrolled hypertension can lead to:  · A " heart attack.  · A stroke.  · A weakened blood vessel (aneurysm).  · Heart failure.  · Kidney damage.  · Eye damage.  · Metabolic syndrome.  · Memory and concentration problems.  What changes can I make to manage my hypertension?  Hypertension can be managed by making lifestyle changes and possibly by taking medicines. Your health care provider will help you make a plan to bring your blood pressure within a normal range.  Eating and drinking    · Eat a diet that is high in fiber and potassium, and low in salt (sodium), added sugar, and fat. An example eating plan is called the DASH (Dietary Approaches to Stop Hypertension) diet. To eat this way:  ? Eat plenty of fresh fruits and vegetables. Try to fill half of your plate at each meal with fruits and vegetables.  ? Eat whole grains, such as whole wheat pasta, brown rice, or whole grain bread. Fill about one quarter of your plate with whole grains.  ? Eat low-fat diary products.  ? Avoid fatty cuts of meat, processed or cured meats, and poultry with skin. Fill about one quarter of your plate with lean proteins such as fish, chicken without skin, beans, eggs, and tofu.  ? Avoid premade and processed foods. These tend to be higher in sodium, added sugar, and fat.  · Reduce your daily sodium intake. Most people with hypertension should eat less than 1,500 mg of sodium a day.  · Limit alcohol intake to no more than 1 drink a day for nonpregnant women and 2 drinks a day for men. One drink equals 12 oz of beer, 5 oz of wine, or 1½ oz of hard liquor.  Lifestyle  · Work with your health care provider to maintain a healthy body weight, or to lose weight. Ask what an ideal weight is for you.  · Get at least 30 minutes of exercise that causes your heart to beat faster (aerobic exercise) most days of the week. Activities may include walking, swimming, or biking.  · Include exercise to strengthen your muscles (resistance exercise), such as weight lifting, as part of your weekly  exercise routine. Try to do these types of exercises for 30 minutes at least 3 days a week.  · Do not use any products that contain nicotine or tobacco, such as cigarettes and e-cigarettes. If you need help quitting, ask your health care provider.  · Control any long-term (chronic) conditions you have, such as high cholesterol or diabetes.  Monitoring  · Monitor your blood pressure at home as told by your health care provider. Your personal target blood pressure may vary depending on your medical conditions, your age, and other factors.  · Have your blood pressure checked regularly, as often as told by your health care provider.  Working with your health care provider  · Review all the medicines you take with your health care provider because there may be side effects or interactions.  · Talk with your health care provider about your diet, exercise habits, and other lifestyle factors that may be contributing to hypertension.  · Visit your health care provider regularly. Your health care provider can help you create and adjust your plan for managing hypertension.  Will I need medicine to control my blood pressure?  Your health care provider may prescribe medicine if lifestyle changes are not enough to get your blood pressure under control, and if:  · Your systolic blood pressure is 130 or higher.  · Your diastolic blood pressure is 80 or higher.  Take medicines only as told by your health care provider. Follow the directions carefully. Blood pressure medicines must be taken as prescribed. The medicine does not work as well when you skip doses. Skipping doses also puts you at risk for problems.  Contact a health care provider if:  · You think you are having a reaction to medicines you have taken.  · You have repeated (recurrent) headaches.  · You feel dizzy.  · You have swelling in your ankles.  · You have trouble with your vision.  Get help right away if:  · You develop a severe headache or confusion.  · You have  unusual weakness or numbness, or you feel faint.  · You have severe pain in your chest or abdomen.  · You vomit repeatedly.  · You have trouble breathing.  Summary  · Hypertension is when the force of blood pumping through your arteries is too strong. If this condition is not controlled, it may put you at risk for serious complications.  · Your personal target blood pressure may vary depending on your medical conditions, your age, and other factors. For most people, a normal blood pressure is less than 120/80.  · Hypertension is managed by lifestyle changes, medicines, or both. Lifestyle changes include weight loss, eating a healthy, low-sodium diet, exercising more, and limiting alcohol.  This information is not intended to replace advice given to you by your health care provider. Make sure you discuss any questions you have with your health care provider.  Document Released: 09/11/2013 Document Revised: 04/10/2020 Document Reviewed: 11/15/2017  [employer] Patient Education © 2020 [employer]

## 2022-01-06 NOTE — ASSESSMENT & PLAN NOTE
This is a chronic and ongoing health concern.  Patient is aware of the risks associated with smoking cigarettes.  Discussed smoking cessation with her today however she declines wanting to quit.  Encouraged her to continue to work on cutting down and eventually quitting if she is able.

## 2022-01-11 ENCOUNTER — HOSPITAL ENCOUNTER (OUTPATIENT)
Dept: LAB | Facility: MEDICAL CENTER | Age: 57
End: 2022-01-11
Attending: NURSE PRACTITIONER
Payer: COMMERCIAL

## 2022-01-11 ENCOUNTER — NON-PROVIDER VISIT (OUTPATIENT)
Dept: MEDICAL GROUP | Facility: PHYSICIAN GROUP | Age: 57
End: 2022-01-11
Payer: COMMERCIAL

## 2022-01-11 VITALS — SYSTOLIC BLOOD PRESSURE: 162 MMHG | DIASTOLIC BLOOD PRESSURE: 110 MMHG

## 2022-01-11 DIAGNOSIS — E78.5 DYSLIPIDEMIA: ICD-10-CM

## 2022-01-11 DIAGNOSIS — D75.89 MACROCYTOSIS: ICD-10-CM

## 2022-01-11 DIAGNOSIS — Z13.21 ENCOUNTER FOR VITAMIN DEFICIENCY SCREENING: ICD-10-CM

## 2022-01-11 DIAGNOSIS — I10 ESSENTIAL HYPERTENSION: ICD-10-CM

## 2022-01-11 LAB
25(OH)D3 SERPL-MCNC: 49 NG/ML (ref 30–100)
ALBUMIN SERPL BCP-MCNC: 5.1 G/DL (ref 3.2–4.9)
ALBUMIN/GLOB SERPL: 2 G/DL
ALP SERPL-CCNC: 61 U/L (ref 30–99)
ALT SERPL-CCNC: 24 U/L (ref 2–50)
ANION GAP SERPL CALC-SCNC: 14 MMOL/L (ref 7–16)
AST SERPL-CCNC: 23 U/L (ref 12–45)
BASOPHILS # BLD AUTO: 0.5 % (ref 0–1.8)
BASOPHILS # BLD: 0.03 K/UL (ref 0–0.12)
BILIRUB SERPL-MCNC: 0.6 MG/DL (ref 0.1–1.5)
BUN SERPL-MCNC: 13 MG/DL (ref 8–22)
CALCIUM SERPL-MCNC: 10.3 MG/DL (ref 8.5–10.5)
CHLORIDE SERPL-SCNC: 102 MMOL/L (ref 96–112)
CHOLEST SERPL-MCNC: 221 MG/DL (ref 100–199)
CO2 SERPL-SCNC: 25 MMOL/L (ref 20–33)
CREAT SERPL-MCNC: 0.65 MG/DL (ref 0.5–1.4)
EOSINOPHIL # BLD AUTO: 0.13 K/UL (ref 0–0.51)
EOSINOPHIL NFR BLD: 2 % (ref 0–6.9)
ERYTHROCYTE [DISTWIDTH] IN BLOOD BY AUTOMATED COUNT: 41.7 FL (ref 35.9–50)
FASTING STATUS PATIENT QL REPORTED: NORMAL
GLOBULIN SER CALC-MCNC: 2.5 G/DL (ref 1.9–3.5)
GLUCOSE SERPL-MCNC: 109 MG/DL (ref 65–99)
HCT VFR BLD AUTO: 43.2 % (ref 37–47)
HDLC SERPL-MCNC: 81 MG/DL
HGB BLD-MCNC: 15.7 G/DL (ref 12–16)
IMM GRANULOCYTES # BLD AUTO: 0.02 K/UL (ref 0–0.11)
IMM GRANULOCYTES NFR BLD AUTO: 0.3 % (ref 0–0.9)
LDLC SERPL CALC-MCNC: 119 MG/DL
LYMPHOCYTES # BLD AUTO: 1.86 K/UL (ref 1–4.8)
LYMPHOCYTES NFR BLD: 28.8 % (ref 22–41)
MCH RBC QN AUTO: 34.9 PG (ref 27–33)
MCHC RBC AUTO-ENTMCNC: 36.3 G/DL (ref 33.6–35)
MCV RBC AUTO: 96 FL (ref 81.4–97.8)
MONOCYTES # BLD AUTO: 0.53 K/UL (ref 0–0.85)
MONOCYTES NFR BLD AUTO: 8.2 % (ref 0–13.4)
NEUTROPHILS # BLD AUTO: 3.89 K/UL (ref 2–7.15)
NEUTROPHILS NFR BLD: 60.2 % (ref 44–72)
NRBC # BLD AUTO: 0 K/UL
NRBC BLD-RTO: 0 /100 WBC
PLATELET # BLD AUTO: 229 K/UL (ref 164–446)
PMV BLD AUTO: 10 FL (ref 9–12.9)
POTASSIUM SERPL-SCNC: 4.3 MMOL/L (ref 3.6–5.5)
PROT SERPL-MCNC: 7.6 G/DL (ref 6–8.2)
RBC # BLD AUTO: 4.5 M/UL (ref 4.2–5.4)
SODIUM SERPL-SCNC: 141 MMOL/L (ref 135–145)
TRIGL SERPL-MCNC: 103 MG/DL (ref 0–149)
WBC # BLD AUTO: 6.5 K/UL (ref 4.8–10.8)

## 2022-01-11 PROCEDURE — 82306 VITAMIN D 25 HYDROXY: CPT

## 2022-01-11 PROCEDURE — 80061 LIPID PANEL: CPT

## 2022-01-11 PROCEDURE — 85025 COMPLETE CBC W/AUTO DIFF WBC: CPT

## 2022-01-11 PROCEDURE — 36415 COLL VENOUS BLD VENIPUNCTURE: CPT

## 2022-01-11 PROCEDURE — 80053 COMPREHEN METABOLIC PANEL: CPT

## 2022-01-11 RX ORDER — LISINOPRIL AND HYDROCHLOROTHIAZIDE 20; 12.5 MG/1; MG/1
1 TABLET ORAL 2 TIMES DAILY
Qty: 180 TABLET | Refills: 3 | Status: SHIPPED | OUTPATIENT
Start: 2022-01-11 | End: 2023-01-31 | Stop reason: SDUPTHER

## 2022-01-11 RX ORDER — LABETALOL 200 MG/1
200 TABLET, FILM COATED ORAL 2 TIMES DAILY
Qty: 180 TABLET | Refills: 3 | Status: SHIPPED | OUTPATIENT
Start: 2022-01-11 | End: 2023-01-31 | Stop reason: SDUPTHER

## 2022-01-11 NOTE — NON-PROVIDER
Elizabeth Suarez is a 56 y.o. female here for a non-provider visit for Bp Check     Vitals:    01/11/22 1128   BP: (!) 162/110   BP Location: Right arm   Patient Position: Sitting   BP Cuff Size: Adult     If abnormal, was the Registered Nurse (office provider if RN is unavailable) notified today? Not Indicated    Routed to PCP? Yes    Elizabeth came in for a bp check and her bp is elevated. Elizabeth let me know she is out of blood pressure medications but I remember you wanted to possibly increase her bp medications at her last appointment. Sent comment to alexandra to refill medication.

## 2022-01-11 NOTE — PROGRESS NOTES
Patient was seen in [facility] visit today for blood pressure recheck.  Blood pressure reading was elevated however patient reported that she ran out of her medication and and that her pharmacy did not receive the refills after her last visit.  New refills were sent to pharmacy on file.  MA called patient for follow-up visit in 1 to 2 months for blood pressure management.

## 2022-03-02 ENCOUNTER — OFFICE VISIT (OUTPATIENT)
Dept: MEDICAL GROUP | Facility: PHYSICIAN GROUP | Age: 57
End: 2022-03-02
Payer: COMMERCIAL

## 2022-03-02 VITALS
RESPIRATION RATE: 16 BRPM | BODY MASS INDEX: 24.43 KG/M2 | SYSTOLIC BLOOD PRESSURE: 120 MMHG | OXYGEN SATURATION: 100 % | WEIGHT: 152 LBS | DIASTOLIC BLOOD PRESSURE: 100 MMHG | HEART RATE: 75 BPM | TEMPERATURE: 96.5 F | HEIGHT: 66 IN

## 2022-03-02 DIAGNOSIS — Z72.0 TOBACCO ABUSE DISORDER: ICD-10-CM

## 2022-03-02 DIAGNOSIS — E78.5 DYSLIPIDEMIA: ICD-10-CM

## 2022-03-02 DIAGNOSIS — I10 ESSENTIAL HYPERTENSION: ICD-10-CM

## 2022-03-02 PROCEDURE — 99214 OFFICE O/P EST MOD 30 MIN: CPT | Performed by: NURSE PRACTITIONER

## 2022-03-02 ASSESSMENT — FIBROSIS 4 INDEX: FIB4 SCORE: 1.13

## 2022-03-02 NOTE — ASSESSMENT & PLAN NOTE
The 10-year ASCVD risk score (Ke NAIR Jr., et al., 2013) is: 4.4%    Values used to calculate the score:      Age: 56 years      Sex: Female      Is Non- : No      Diabetic: No      Tobacco smoker: Yes      Systolic Blood Pressure: 120 mmHg      Is BP treated: Yes      HDL Cholesterol: 81 mg/dL      Total Cholesterol: 221 mg/dL    Chronic stable condition.  Patient is currently taking simvastatin 20 mg tablet daily and tolerating it well.  She denies any myalgias.  She does report that she has ran out of her medication.  After review of medication she still currently has 3 refills at her pharmacy.  Encourage patient to pick her prescription up and restart simvastatin.    Plan  Discussed appropriate diet lifestyle modifications.  Encourage patient to restart simvastatin.  Will discuss at next appointment changing to higher intensity statin due to uncontrolled total cholesterol and LDL.  Patient is up-to-date with labs.

## 2022-03-02 NOTE — PROGRESS NOTES
Chief Complaint   Patient presents with   • Follow-Up     htn       Subjective:     HPI:   Elizabeth Suarez is a 56 y.o. female here to discuss the evaluation and management of:      Essential hypertension  This is a chronic and uncontrolled condition.  Patient's blood pressure today in clinic is 140/110 with repeat blood pressure 120/110.   She denies any headaches, blurred vision, chest pain, syncopal episodes, dizziness, or shortness of breath.    She is currently taking lisinopril/hydrochlorothiazide 20-12.5 mg daily and labetalol 200 mg twice daily.  Recently stopped drinking alcohol daily.  Continues to smoke 1 to 2 cigarettes at night.  She is aware the risk associated continue to smoke.    Plan  Discussed appropriate diet lifestyle modifications.  Encourage patient to quit smoking.  Continue on lisinopril/hydrochlorothiazide and labetalol.  Discussed starting third medication however patient would like to give it 30 more days.  I was agreeable to this.  Patient will do at home blood pressure monitoring daily 3 days a week and bring in blood pressure log and cuff at next appointment.    Dyslipidemia  The 10-year ASCVD risk score (Wasilla DC Jr., et al., 2013) is: 4.4%    Values used to calculate the score:      Age: 56 years      Sex: Female      Is Non- : No      Diabetic: No      Tobacco smoker: Yes      Systolic Blood Pressure: 120 mmHg      Is BP treated: Yes      HDL Cholesterol: 81 mg/dL      Total Cholesterol: 221 mg/dL    Chronic stable condition.  Patient is currently taking simvastatin 20 mg tablet daily and tolerating it well.  She denies any myalgias.  She does report that she has ran out of her medication.  After review of medication she still currently has 3 refills at her pharmacy.  Encourage patient to pick her prescription up and restart simvastatin.    Plan  Discussed appropriate diet lifestyle modifications.  Encourage patient to restart simvastatin.  Will discuss at  "next appointment changing to higher intensity statin due to uncontrolled total cholesterol and LDL.  Patient is up-to-date with labs.    Tobacco abuse disorder  This is a chronic health concern.  Patient reports that she smokes 1 to 2 cigarettes at night only.  She is aware the risk associated and continuing to smoke.  Encourage patient to work on cutting down and eventually quitting.              ROS:  Gen: no fevers/chills, no changes in weight  Eyes: no changes in vision  ENT: no sore throat, no hearing loss, no bloody nose  Pulm: no sob, no cough  CV: no chest pain, no palpitations  GI: no nausea/vomiting, no diarrhea  Neuro: no headaches, no numbness/tingling      Allergies   Allergen Reactions   • Nkda [No Known Drug Allergy]        Current medicines (including changes today)  Current Outpatient Medications   Medication Sig Dispense Refill   • lisinopril-hydrochlorothiazide (PRINZIDE) 20-12.5 MG per tablet Take 1 Tablet by mouth 2 times a day. 180 Tablet 3   • labetalol (NORMODYNE) 200 MG Tab Take 1 Tablet by mouth 2 times a day. 180 Tablet 3   • simvastatin (ZOCOR) 20 MG Tab Take 1 Tablet by mouth every evening. 90 Tablet 3   • aspirin EC (ECOTRIN) 81 MG TBEC Take 81 mg by mouth every day.       No current facility-administered medications for this visit.          Objective:       /100   Pulse 75   Temp 35.8 °C (96.5 °F)   Resp 16   Ht 1.676 m (5' 6\")   Wt 68.9 kg (152 lb)   SpO2 100%  Body mass index is 24.53 kg/m².    Physical Exam:  Constitutional: Well-developed and well-nourished female in NAD. Not diaphoretic. No distress.   Skin: warm, dry, intact, no evidence of rash or concerning lesions  Head: Atraumatic without lesions.  Eyes: Conjunctivae and extraocular motions are normal. Pupils are equal, round, and reactive to light. No scleral icterus.   Cardiovascular: Regular rate and rhythm without murmur. Radial pulses are intact and equal bilaterally.  Pulmonary: Clear to ausculation. Normal " effort. No rales, ronchi, or wheezing..   Extremities: No cyanosis, clubbing, erythema, nor edema.   Neurological: Alert and oriented x 3.   Psychiatric:  Behavior, mood, and affect are appropriate.  Assessment and Plan:     The following treatment plan was discussed:      1. Essential hypertension    2. Dyslipidemia    3. Tobacco abuse disorder      Any change or worsening of signs or symptoms, patient encouraged to follow-up or report to emergency room for further evaluation. Patient verbalizes understanding and agrees.    Follow-Up: Return in about 5 weeks (around 4/6/2022) for HTN.      PLEASE NOTE: This dictation was created using voice recognition software. I have made every reasonable attempt to correct obvious errors, but I expect that there are errors of grammar and possibly content that I did not discover before finalizing the note.

## 2022-03-02 NOTE — ASSESSMENT & PLAN NOTE
This is a chronic and uncontrolled condition.  Patient's blood pressure today in clinic is 140/110 with repeat blood pressure 120/110.   She denies any headaches, blurred vision, chest pain, syncopal episodes, dizziness, or shortness of breath.    She is currently taking lisinopril/hydrochlorothiazide 20-12.5 mg daily and labetalol 200 mg twice daily.  Recently stopped drinking alcohol daily.  Continues to smoke 1 to 2 cigarettes at night.  She is aware the risk associated continue to smoke.    Plan  Discussed appropriate diet lifestyle modifications.  Encourage patient to quit smoking.  Continue on lisinopril/hydrochlorothiazide and labetalol.  Discussed starting third medication however patient would like to give it 30 more days.  I was agreeable to this.  Patient will do at home blood pressure monitoring daily 3 days a week and bring in blood pressure log and cuff at next appointment.

## 2022-03-02 NOTE — ASSESSMENT & PLAN NOTE
This is a chronic health concern.  Patient reports that she smokes 1 to 2 cigarettes at night only.  She is aware the risk associated and continuing to smoke.  Encourage patient to work on cutting down and eventually quitting.

## 2022-03-02 NOTE — PATIENT INSTRUCTIONS
"Managing Your Hypertension  Hypertension is commonly called high blood pressure. This is when the force of your blood pressing against the walls of your arteries is too strong. Arteries are blood vessels that carry blood from your heart throughout your body. Hypertension forces the heart to work harder to pump blood, and may cause the arteries to become narrow or stiff. Having untreated or uncontrolled hypertension can cause heart attack, stroke, kidney disease, and other problems.  What are blood pressure readings?  A blood pressure reading consists of a higher number over a lower number. Ideally, your blood pressure should be below 120/80. The first (\"top\") number is called the systolic pressure. It is a measure of the pressure in your arteries as your heart beats. The second (\"bottom\") number is called the diastolic pressure. It is a measure of the pressure in your arteries as the heart relaxes.  What does my blood pressure reading mean?  Blood pressure is classified into four stages. Based on your blood pressure reading, your health care provider may use the following stages to determine what type of treatment you need, if any. Systolic pressure and diastolic pressure are measured in a unit called mm Hg.  Normal  · Systolic pressure: below 120.  · Diastolic pressure: below 80.  Elevated  · Systolic pressure: 120-129.  · Diastolic pressure: below 80.  Hypertension stage 1  · Systolic pressure: 130-139.  · Diastolic pressure: 80-89.  Hypertension stage 2  · Systolic pressure: 140 or above.  · Diastolic pressure: 90 or above.  What health risks are associated with hypertension?  Managing your hypertension is an important responsibility. Uncontrolled hypertension can lead to:  · A heart attack.  · A stroke.  · A weakened blood vessel (aneurysm).  · Heart failure.  · Kidney damage.  · Eye damage.  · Metabolic syndrome.  · Memory and concentration problems.  What changes can I make to manage my " hypertension?  Hypertension can be managed by making lifestyle changes and possibly by taking medicines. Your health care provider will help you make a plan to bring your blood pressure within a normal range.  Eating and drinking    · Eat a diet that is high in fiber and potassium, and low in salt (sodium), added sugar, and fat. An example eating plan is called the DASH (Dietary Approaches to Stop Hypertension) diet. To eat this way:  ? Eat plenty of fresh fruits and vegetables. Try to fill half of your plate at each meal with fruits and vegetables.  ? Eat whole grains, such as whole wheat pasta, brown rice, or whole grain bread. Fill about one quarter of your plate with whole grains.  ? Eat low-fat diary products.  ? Avoid fatty cuts of meat, processed or cured meats, and poultry with skin. Fill about one quarter of your plate with lean proteins such as fish, chicken without skin, beans, eggs, and tofu.  ? Avoid premade and processed foods. These tend to be higher in sodium, added sugar, and fat.  · Reduce your daily sodium intake. Most people with hypertension should eat less than 1,500 mg of sodium a day.  · Limit alcohol intake to no more than 1 drink a day for nonpregnant women and 2 drinks a day for men. One drink equals 12 oz of beer, 5 oz of wine, or 1½ oz of hard liquor.  Lifestyle  · Work with your health care provider to maintain a healthy body weight, or to lose weight. Ask what an ideal weight is for you.  · Get at least 30 minutes of exercise that causes your heart to beat faster (aerobic exercise) most days of the week. Activities may include walking, swimming, or biking.  · Include exercise to strengthen your muscles (resistance exercise), such as weight lifting, as part of your weekly exercise routine. Try to do these types of exercises for 30 minutes at least 3 days a week.  · Do not use any products that contain nicotine or tobacco, such as cigarettes and e-cigarettes. If you need help quitting,  ask your health care provider.  · Control any long-term (chronic) conditions you have, such as high cholesterol or diabetes.  Monitoring  · Monitor your blood pressure at home as told by your health care provider. Your personal target blood pressure may vary depending on your medical conditions, your age, and other factors.  · Have your blood pressure checked regularly, as often as told by your health care provider.  Working with your health care provider  · Review all the medicines you take with your health care provider because there may be side effects or interactions.  · Talk with your health care provider about your diet, exercise habits, and other lifestyle factors that may be contributing to hypertension.  · Visit your health care provider regularly. Your health care provider can help you create and adjust your plan for managing hypertension.  Will I need medicine to control my blood pressure?  Your health care provider may prescribe medicine if lifestyle changes are not enough to get your blood pressure under control, and if:  · Your systolic blood pressure is 130 or higher.  · Your diastolic blood pressure is 80 or higher.  Take medicines only as told by your health care provider. Follow the directions carefully. Blood pressure medicines must be taken as prescribed. The medicine does not work as well when you skip doses. Skipping doses also puts you at risk for problems.  Contact a health care provider if:  · You think you are having a reaction to medicines you have taken.  · You have repeated (recurrent) headaches.  · You feel dizzy.  · You have swelling in your ankles.  · You have trouble with your vision.  Get help right away if:  · You develop a severe headache or confusion.  · You have unusual weakness or numbness, or you feel faint.  · You have severe pain in your chest or abdomen.  · You vomit repeatedly.  · You have trouble breathing.  Summary  · Hypertension is when the force of blood pumping  through your arteries is too strong. If this condition is not controlled, it may put you at risk for serious complications.  · Your personal target blood pressure may vary depending on your medical conditions, your age, and other factors. For most people, a normal blood pressure is less than 120/80.  · Hypertension is managed by lifestyle changes, medicines, or both. Lifestyle changes include weight loss, eating a healthy, low-sodium diet, exercising more, and limiting alcohol.  This information is not intended to replace advice given to you by your health care provider. Make sure you discuss any questions you have with your health care provider.  Document Released: 09/11/2013 Document Revised: 04/10/2020 Document Reviewed: 11/15/2017  Elsevier Patient Education © 2020 [employer]

## 2022-03-16 ENCOUNTER — NON-PROVIDER VISIT (OUTPATIENT)
Dept: MEDICAL GROUP | Facility: PHYSICIAN GROUP | Age: 57
End: 2022-03-16
Payer: COMMERCIAL

## 2022-03-16 VITALS — DIASTOLIC BLOOD PRESSURE: 78 MMHG | SYSTOLIC BLOOD PRESSURE: 110 MMHG

## 2022-03-16 NOTE — NON-PROVIDER
Elizabeth Suarez is a 56 y.o. female here for a non-provider visit for BP    Vitals:    03/16/22 1428   BP: 110/78   BP Location: Right arm   Patient Position: Sitting   BP Cuff Size: Adult     If abnormal, was the Registered Nurse (office provider if RN is unavailable) notified today? Yes    Routed to PCP? No

## 2022-04-14 ENCOUNTER — OFFICE VISIT (OUTPATIENT)
Dept: MEDICAL GROUP | Facility: PHYSICIAN GROUP | Age: 57
End: 2022-04-14
Payer: COMMERCIAL

## 2022-04-14 VITALS
BODY MASS INDEX: 25.33 KG/M2 | HEART RATE: 78 BPM | RESPIRATION RATE: 18 BRPM | DIASTOLIC BLOOD PRESSURE: 80 MMHG | SYSTOLIC BLOOD PRESSURE: 136 MMHG | WEIGHT: 157.6 LBS | TEMPERATURE: 97 F | OXYGEN SATURATION: 95 % | HEIGHT: 66 IN

## 2022-04-14 DIAGNOSIS — F41.9 ANXIETY: ICD-10-CM

## 2022-04-14 DIAGNOSIS — E78.5 DYSLIPIDEMIA: ICD-10-CM

## 2022-04-14 DIAGNOSIS — Z72.0 TOBACCO ABUSE DISORDER: ICD-10-CM

## 2022-04-14 DIAGNOSIS — D75.89 MACROCYTOSIS: ICD-10-CM

## 2022-04-14 DIAGNOSIS — I10 ESSENTIAL HYPERTENSION: ICD-10-CM

## 2022-04-14 DIAGNOSIS — R73.01 IMPAIRED FASTING BLOOD SUGAR: ICD-10-CM

## 2022-04-14 PROCEDURE — 99214 OFFICE O/P EST MOD 30 MIN: CPT | Performed by: NURSE PRACTITIONER

## 2022-04-14 ASSESSMENT — FIBROSIS 4 INDEX: FIB4 SCORE: 1.13

## 2022-04-14 NOTE — ASSESSMENT & PLAN NOTE
Patient reports that she is restarted back on her simvastatin daily.  She reports she is tolerating well without any myalgias.    Patient is up-to-date with labs.    Plan  Discussed appropriate diet lifestyle modifications.  Continue on simvastatin 20 mg tablet daily.

## 2022-04-14 NOTE — PROGRESS NOTES
Chief Complaint   Patient presents with   • Hypertension Follow-up       Subjective:     HPI:   Elizabeth Suarez is a 56 y.o. female here to discuss the evaluation and management of:      Essential hypertension  Chronic and well-controlled.  Home BP log over the past month: 128/77, 123/79, 114/76, 122/77, 126/77, 127/73, 124/80, 107/68, 120/87, and 126/74.   Patient denies any headaches, vision changes, chest pain, shortness of breath, swelling of the legs, dizziness, or syncopal episodes.    Currently taking labetalol 200 mg twice daily and lisinopril/hydrochlorthiazide 20-12.5 mg once daily.  She reports she is tolerating his medications well without any adverse effects.    Plan  Patient will continue on lisinopril/hydrochlorothiazide and labetalol.  Discussed the importance of ongoing diet lifestyle modifications.  Encourage patient to quit smoking.      Dyslipidemia  Patient reports that she is restarted back on her simvastatin daily.  She reports she is tolerating well without any myalgias.    Patient is up-to-date with labs.    Plan  Discussed appropriate diet lifestyle modifications.  Continue on simvastatin 20 mg tablet daily.        Anxiety  Chronic condition.  Patient reports that since her blood pressure has been more well controlled that her anxiety level has decreased.    Plan  Discussed appropriate coping skills and mindfulness.    Tobacco abuse disorder  Patient does continue to smoke 1 to 2 cigarettes at night.  She is aware the risk associated in doing this.    Plan   was given to patient regards to risk associated with smoking especially in light of hypertension.  Encourage patient to quit smoking.          ROS:  Gen: no fevers/chills, no changes in weight  Pulm: no sob, no cough  CV: no chest pain, no palpitations  GI: no nausea/vomiting, no diarrhea  MSk: no myalgias  Neuro: no headaches, no numbness/tingling      Allergies   Allergen Reactions   • Nkda [No Known Drug Allergy]   "      Current medicines (including changes today)  Current Outpatient Medications   Medication Sig Dispense Refill   • lisinopril-hydrochlorothiazide (PRINZIDE) 20-12.5 MG per tablet Take 1 Tablet by mouth 2 times a day. 180 Tablet 3   • labetalol (NORMODYNE) 200 MG Tab Take 1 Tablet by mouth 2 times a day. 180 Tablet 3   • simvastatin (ZOCOR) 20 MG Tab Take 1 Tablet by mouth every evening. 90 Tablet 3   • aspirin EC (ECOTRIN) 81 MG TBEC Take 81 mg by mouth every day.       No current facility-administered medications for this visit.          Objective:       /80   Pulse 78   Temp 36.1 °C (97 °F) (Temporal)   Resp 18   Ht 1.676 m (5' 6\")   Wt 71.5 kg (157 lb 9.6 oz)   SpO2 95%  Body mass index is 25.44 kg/m².    Physical Exam:  Constitutional: Well-developed and well-nourished female in NAD. Not diaphoretic. No distress.   Skin: warm, dry, intact  Cardiovascular: Regular rate and rhythm without murmur. Radial pulses are intact and equal bilaterally.  Pulmonary: Clear to ausculation. Normal effort. No rales, ronchi, or wheezing.  Abdomen: Soft, non tender, and without distention. Active bowel sounds in all four quadrants.   Extremities: No cyanosis, clubbing, erythema, nor edema.   Neurological: Alert and oriented x 3.   Psychiatric:  Behavior, mood, and affect are appropriate.      Assessment and Plan:     The following treatment plan was discussed:  Encourage patient to get mammogram completed.    1. Essential hypertension  - Comp Metabolic Panel; Future  - CBC WITH DIFFERENTIAL; Future    2. Dyslipidemia  - Lipid Profile; Future    3. Anxiety  - CBC WITH DIFFERENTIAL; Future    4. Tobacco abuse disorder  - CBC WITH DIFFERENTIAL; Future    5. Macrocytosis  - CBC WITH DIFFERENTIAL; Future    6. Impaired fasting blood sugar  - Comp Metabolic Panel; Future  - HEMOGLOBIN A1C; Future      Any change or worsening of signs or symptoms, patient encouraged to follow-up or report to urgent care or emergency room " for further evaluation. Patient verbalizes understanding and agrees.    Follow-Up: Return in about 10 months (around 2/15/2023) for Follow up labs.      PLEASE NOTE: This dictation was created using voice recognition software. I have made every reasonable attempt to correct obvious errors, but I expect that there are errors of grammar and possibly content that I did not discover before finalizing the note.

## 2022-04-14 NOTE — PATIENT INSTRUCTIONS
"Smoking Cessation, Tips for Success  If you are ready to quit smoking, congratulations! You have chosen to help yourself be healthier. Cigarettes bring nicotine, tar, carbon monoxide, and other irritants into your body. Your lungs, heart, and blood vessels will be able to work better without these poisons. There are many different ways to quit smoking. Nicotine gum, nicotine patches, a nicotine inhaler, or nicotine nasal spray can help with physical craving. Hypnosis, support groups, and medicines help break the habit of smoking.  WHAT THINGS CAN I DO TO MAKE QUITTING EASIER?   Here are some tips to help you quit for good:  · Pick a date when you will quit smoking completely. Tell all of your friends and family about your plan to quit on that date.  · Do not try to slowly cut down on the number of cigarettes you are smoking. Pick a quit date and quit smoking completely starting on that day.  · Throw away all cigarettes.    · Clean and remove all ashtrays from your home, work, and car.  · On a card, write down your reasons for quitting. Carry the card with you and read it when you get the urge to smoke.  · Cleanse your body of nicotine. Drink enough water and fluids to keep your urine clear or pale yellow. Do this after quitting to flush the nicotine from your body.  · Learn to predict your moods. Do not let a bad situation be your excuse to have a cigarette. Some situations in your life might tempt you into wanting a cigarette.  · Never have \"just one\" cigarette. It leads to wanting another and another. Remind yourself of your decision to quit.  · Change habits associated with smoking. If you smoked while driving or when feeling stressed, try other activities to replace smoking. Stand up when drinking your coffee. Brush your teeth after eating. Sit in a different chair when you read the paper. Avoid alcohol while trying to quit, and try to drink fewer caffeinated beverages. Alcohol and caffeine may urge you to " "smoke.  · Avoid foods and drinks that can trigger a desire to smoke, such as sugary or spicy foods and alcohol.  · Ask people who smoke not to smoke around you.  · Have something planned to do right after eating or having a cup of coffee. For example, plan to take a walk or exercise.  · Try a relaxation exercise to calm you down and decrease your stress. Remember, you may be tense and nervous for the first 2 weeks after you quit, but this will pass.  · Find new activities to keep your hands busy. Play with a pen, coin, or rubber band. Doodle or draw things on paper.  · Brush your teeth right after eating. This will help cut down on the craving for the taste of tobacco after meals. You can also try mouthwash.    · Use oral substitutes in place of cigarettes. Try using lemon drops, carrots, cinnamon sticks, or chewing gum. Keep them handy so they are available when you have the urge to smoke.  · When you have the urge to smoke, try deep breathing.  · Designate your home as a nonsmoking area.  · If you are a heavy smoker, ask your health care provider about a prescription for nicotine chewing gum. It can ease your withdrawal from nicotine.  · Reward yourself. Set aside the cigarette money you save and buy yourself something nice.  · Look for support from others. Join a support group or smoking cessation program. Ask someone at home or at work to help you with your plan to quit smoking.  · Always ask yourself, \"Do I need this cigarette or is this just a reflex?\" Tell yourself, \"Today, I choose not to smoke,\" or \"I do not want to smoke.\" You are reminding yourself of your decision to quit.  · Do not replace cigarette smoking with electronic cigarettes (commonly called e-cigarettes). The safety of e-cigarettes is unknown, and some may contain harmful chemicals.  · If you relapse, do not give up! Plan ahead and think about what you will do the next time you get the urge to smoke.  HOW WILL I FEEL WHEN I QUIT SMOKING?  You " may have symptoms of withdrawal because your body is used to nicotine (the addictive substance in cigarettes). You may crave cigarettes, be irritable, feel very hungry, cough often, get headaches, or have difficulty concentrating. The withdrawal symptoms are only temporary. They are strongest when you first quit but will go away within 10-14 days. When withdrawal symptoms occur, stay in control. Think about your reasons for quitting. Remind yourself that these are signs that your body is healing and getting used to being without cigarettes. Remember that withdrawal symptoms are easier to treat than the major diseases that smoking can cause.   Even after the withdrawal is over, expect periodic urges to smoke. However, these cravings are generally short lived and will go away whether you smoke or not. Do not smoke!  WHAT RESOURCES ARE AVAILABLE TO HELP ME QUIT SMOKING?  Your health care provider can direct you to community resources or hospitals for support, which may include:  · Group support.  · Education.  · Hypnosis.  · Therapy.     This information is not intended to replace advice given to you by your health care provider. Make sure you discuss any questions you have with your health care provider.     Document Released: 09/15/2005 Document Revised: 01/08/2016 Document Reviewed: 06/05/2014  [employer] Interactive Patient Education ©2016 [employer]

## 2022-04-14 NOTE — ASSESSMENT & PLAN NOTE
Chronic condition.  Patient reports that since her blood pressure has been more well controlled that her anxiety level has decreased.    Plan  Discussed appropriate coping skills and mindfulness.

## 2022-04-14 NOTE — ASSESSMENT & PLAN NOTE
Patient does continue to smoke 1 to 2 cigarettes at night.  She is aware the risk associated in doing this.    Plan   was given to patient regards to risk associated with smoking especially in light of hypertension.  Encourage patient to quit smoking.

## 2022-04-14 NOTE — ASSESSMENT & PLAN NOTE
Chronic and well-controlled.  Home BP log over the past month: 128/77, 123/79, 114/76, 122/77, 126/77, 127/73, 124/80, 107/68, 120/87, and 126/74.   Patient denies any headaches, vision changes, chest pain, shortness of breath, swelling of the legs, dizziness, or syncopal episodes.    Currently taking labetalol 200 mg twice daily and lisinopril/hydrochlorthiazide 20-12.5 mg once daily.  She reports she is tolerating his medications well without any adverse effects.    Plan  Patient will continue on lisinopril/hydrochlorothiazide and labetalol.  Discussed the importance of ongoing diet lifestyle modifications.  Encourage patient to quit smoking.

## 2022-07-18 ENCOUNTER — HOSPITAL ENCOUNTER (OUTPATIENT)
Dept: RADIOLOGY | Facility: MEDICAL CENTER | Age: 57
End: 2022-07-18
Attending: NURSE PRACTITIONER
Payer: COMMERCIAL

## 2022-07-18 DIAGNOSIS — Z12.31 ENCOUNTER FOR SCREENING MAMMOGRAM FOR BREAST CANCER: ICD-10-CM

## 2022-07-18 PROCEDURE — 77063 BREAST TOMOSYNTHESIS BI: CPT

## 2023-01-31 ENCOUNTER — HOSPITAL ENCOUNTER (OUTPATIENT)
Dept: LAB | Facility: MEDICAL CENTER | Age: 58
End: 2023-01-31
Attending: NURSE PRACTITIONER
Payer: COMMERCIAL

## 2023-01-31 ENCOUNTER — OFFICE VISIT (OUTPATIENT)
Dept: MEDICAL GROUP | Facility: PHYSICIAN GROUP | Age: 58
End: 2023-01-31
Payer: COMMERCIAL

## 2023-01-31 VITALS
BODY MASS INDEX: 24.91 KG/M2 | HEIGHT: 66 IN | OXYGEN SATURATION: 96 % | SYSTOLIC BLOOD PRESSURE: 126 MMHG | HEART RATE: 96 BPM | TEMPERATURE: 97.8 F | DIASTOLIC BLOOD PRESSURE: 82 MMHG | WEIGHT: 155 LBS | RESPIRATION RATE: 15 BRPM

## 2023-01-31 DIAGNOSIS — I10 ESSENTIAL HYPERTENSION: ICD-10-CM

## 2023-01-31 DIAGNOSIS — Z72.0 TOBACCO ABUSE DISORDER: ICD-10-CM

## 2023-01-31 DIAGNOSIS — R73.01 IMPAIRED FASTING BLOOD SUGAR: ICD-10-CM

## 2023-01-31 DIAGNOSIS — E78.5 DYSLIPIDEMIA: ICD-10-CM

## 2023-01-31 DIAGNOSIS — F41.9 ANXIETY: ICD-10-CM

## 2023-01-31 DIAGNOSIS — D75.89 MACROCYTOSIS: ICD-10-CM

## 2023-01-31 LAB
ALBUMIN SERPL BCP-MCNC: 4.8 G/DL (ref 3.2–4.9)
ALBUMIN/GLOB SERPL: 2.1 G/DL
ALP SERPL-CCNC: 49 U/L (ref 30–99)
ALT SERPL-CCNC: 21 U/L (ref 2–50)
ANION GAP SERPL CALC-SCNC: 11 MMOL/L (ref 7–16)
AST SERPL-CCNC: 19 U/L (ref 12–45)
BASOPHILS # BLD AUTO: 0.7 % (ref 0–1.8)
BASOPHILS # BLD: 0.04 K/UL (ref 0–0.12)
BILIRUB SERPL-MCNC: 0.6 MG/DL (ref 0.1–1.5)
BUN SERPL-MCNC: 11 MG/DL (ref 8–22)
CALCIUM ALBUM COR SERPL-MCNC: 9.8 MG/DL (ref 8.5–10.5)
CALCIUM SERPL-MCNC: 10.4 MG/DL (ref 8.5–10.5)
CHLORIDE SERPL-SCNC: 105 MMOL/L (ref 96–112)
CHOLEST SERPL-MCNC: 175 MG/DL (ref 100–199)
CO2 SERPL-SCNC: 26 MMOL/L (ref 20–33)
CREAT SERPL-MCNC: 0.6 MG/DL (ref 0.5–1.4)
EOSINOPHIL # BLD AUTO: 0.26 K/UL (ref 0–0.51)
EOSINOPHIL NFR BLD: 4.5 % (ref 0–6.9)
ERYTHROCYTE [DISTWIDTH] IN BLOOD BY AUTOMATED COUNT: 44.8 FL (ref 35.9–50)
EST. AVERAGE GLUCOSE BLD GHB EST-MCNC: 111 MG/DL
FASTING STATUS PATIENT QL REPORTED: NORMAL
GFR SERPLBLD CREATININE-BSD FMLA CKD-EPI: 105 ML/MIN/1.73 M 2
GLOBULIN SER CALC-MCNC: 2.3 G/DL (ref 1.9–3.5)
GLUCOSE SERPL-MCNC: 105 MG/DL (ref 65–99)
HBA1C MFR BLD: 5.5 % (ref 4–5.6)
HCT VFR BLD AUTO: 42.2 % (ref 37–47)
HDLC SERPL-MCNC: 73 MG/DL
HGB BLD-MCNC: 14.7 G/DL (ref 12–16)
IMM GRANULOCYTES # BLD AUTO: 0.01 K/UL (ref 0–0.11)
IMM GRANULOCYTES NFR BLD AUTO: 0.2 % (ref 0–0.9)
LDLC SERPL CALC-MCNC: 85 MG/DL
LYMPHOCYTES # BLD AUTO: 1.73 K/UL (ref 1–4.8)
LYMPHOCYTES NFR BLD: 29.7 % (ref 22–41)
MCH RBC QN AUTO: 33.4 PG (ref 27–33)
MCHC RBC AUTO-ENTMCNC: 34.8 G/DL (ref 33.6–35)
MCV RBC AUTO: 95.9 FL (ref 81.4–97.8)
MONOCYTES # BLD AUTO: 0.46 K/UL (ref 0–0.85)
MONOCYTES NFR BLD AUTO: 7.9 % (ref 0–13.4)
NEUTROPHILS # BLD AUTO: 3.32 K/UL (ref 2–7.15)
NEUTROPHILS NFR BLD: 57 % (ref 44–72)
NRBC # BLD AUTO: 0 K/UL
NRBC BLD-RTO: 0 /100 WBC
PLATELET # BLD AUTO: 217 K/UL (ref 164–446)
PMV BLD AUTO: 9.7 FL (ref 9–12.9)
POTASSIUM SERPL-SCNC: 4.1 MMOL/L (ref 3.6–5.5)
PROT SERPL-MCNC: 7.1 G/DL (ref 6–8.2)
RBC # BLD AUTO: 4.4 M/UL (ref 4.2–5.4)
SODIUM SERPL-SCNC: 142 MMOL/L (ref 135–145)
TRIGL SERPL-MCNC: 83 MG/DL (ref 0–149)
WBC # BLD AUTO: 5.8 K/UL (ref 4.8–10.8)

## 2023-01-31 PROCEDURE — 80053 COMPREHEN METABOLIC PANEL: CPT

## 2023-01-31 PROCEDURE — 83036 HEMOGLOBIN GLYCOSYLATED A1C: CPT

## 2023-01-31 PROCEDURE — 80061 LIPID PANEL: CPT

## 2023-01-31 PROCEDURE — 85025 COMPLETE CBC W/AUTO DIFF WBC: CPT

## 2023-01-31 PROCEDURE — 36415 COLL VENOUS BLD VENIPUNCTURE: CPT

## 2023-01-31 PROCEDURE — 99214 OFFICE O/P EST MOD 30 MIN: CPT | Performed by: NURSE PRACTITIONER

## 2023-01-31 RX ORDER — LABETALOL 200 MG/1
200 TABLET, FILM COATED ORAL 2 TIMES DAILY
Qty: 180 TABLET | Refills: 3 | Status: SHIPPED | OUTPATIENT
Start: 2023-01-31

## 2023-01-31 RX ORDER — LISINOPRIL AND HYDROCHLOROTHIAZIDE 20; 12.5 MG/1; MG/1
1 TABLET ORAL 2 TIMES DAILY
Qty: 180 TABLET | Refills: 3 | Status: SHIPPED | OUTPATIENT
Start: 2023-01-31

## 2023-01-31 RX ORDER — SIMVASTATIN 20 MG
20 TABLET ORAL EVERY EVENING
Qty: 90 TABLET | Refills: 3 | Status: SHIPPED | OUTPATIENT
Start: 2023-01-31

## 2023-01-31 ASSESSMENT — FIBROSIS 4 INDEX: FIB4 SCORE: 1.15

## 2023-01-31 ASSESSMENT — PATIENT HEALTH QUESTIONNAIRE - PHQ9: CLINICAL INTERPRETATION OF PHQ2 SCORE: 0

## 2023-01-31 NOTE — ASSESSMENT & PLAN NOTE
Chronic condition.  Patient is due for labs, patient is fasting today and will obtain labs this morning.  Patient is currently on simvastatin 20 mg tablet nightly.  She reports she is tolerating this medication well and denies any myalgias.

## 2023-01-31 NOTE — ASSESSMENT & PLAN NOTE
Chronic well-controlled condition.  Blood pressure today clinic is 136/82.  Patient is currently on labetalol 200 mg twice daily, lisinopril hydrochlorothiazide 20-12.5 mg 1 tablet twice daily.  She denies any adverse effects to her medications.    Patient denies any vision changes, leg swelling, chest pain, shortness of breath, dizziness, or headaches.

## 2023-01-31 NOTE — PROGRESS NOTES
"Chief Complaint   Patient presents with    Medication Refill       Subjective:     HPI:   Elizabeth Suarez is a 57 y.o. female here to discuss the evaluation and management of:      Essential hypertension  Chronic well-controlled condition.  Blood pressure today clinic is 136/82.  Patient is currently on labetalol 200 mg twice daily, lisinopril hydrochlorothiazide 20-12.5 mg 1 tablet twice daily.  She denies any adverse effects to her medications.    Patient denies any vision changes, leg swelling, chest pain, shortness of breath, dizziness, or headaches.    Dyslipidemia  Chronic condition.  Patient is due for labs, patient is fasting today and will obtain labs this morning.  Patient is currently on simvastatin 20 mg tablet nightly.  She reports she is tolerating this medication well and denies any myalgias.      ROS:  Gen: no fevers/chills, no changes in weight  Pulm: no sob, no cough  CV: no chest pain, no palpitations  GI: no nausea/vomiting, no diarrhea  MSk: no myalgias  Neuro: no headaches, no numbness/tingling      Allergies   Allergen Reactions    Nkda [No Known Drug Allergy]        Current medicines (including changes today)  Current Outpatient Medications   Medication Sig Dispense Refill    lisinopril-hydrochlorothiazide (PRINZIDE) 20-12.5 MG per tablet Take 1 Tablet by mouth 2 times a day. 180 Tablet 3    labetalol (NORMODYNE) 200 MG Tab Take 1 Tablet by mouth 2 times a day. 180 Tablet 3    simvastatin (ZOCOR) 20 MG Tab Take 1 Tablet by mouth every evening. 90 Tablet 3    aspirin EC (ECOTRIN) 81 MG TBEC Take 81 mg by mouth every day.       No current facility-administered medications for this visit.          Objective:       /82   Pulse 96   Temp 36.6 °C (97.8 °F) (Temporal)   Resp 15   Ht 1.676 m (5' 6\")   Wt 70.3 kg (155 lb)   SpO2 96%  Body mass index is 25.02 kg/m².    Physical Exam:  Constitutional: Well-developed and well-nourished female in NAD. Not diaphoretic. No distress.   Skin: " warm, dry, intact  Cardiovascular: Regular rate and rhythm without murmur. Radial pulses are intact and equal bilaterally.  Pulmonary: Clear to ausculation. Normal effort. No rales, ronchi, or wheezing.  Extremities: No cyanosis, clubbing, erythema, nor edema.   Neurological: Alert and oriented x 3.   Psychiatric:  Behavior, mood, and affect are appropriate.      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  Patient does not have documentation today recommended patient bring in documentation for influenza vaccine.  Discussed need for mammogram in 2024 and colonoscopy October 2023.    1. Essential hypertension  Chronic well-controlled condition.  Refill of lisinopril hydrochlorothiazide 20-12.5 mg 1 tablet twice daily and labetalol 200 mg 1 tablet twice daily was sent to pharmacy.  Discussed appropriate diet lifestyle modifications.  Patient will obtain labs today.  Will discuss results via [facility].  - lisinopril-hydrochlorothiazide (PRINZIDE) 20-12.5 MG per tablet; Take 1 Tablet by mouth 2 times a day.  Dispense: 180 Tablet; Refill: 3  - labetalol (NORMODYNE) 200 MG Tab; Take 1 Tablet by mouth 2 times a day.  Dispense: 180 Tablet; Refill: 3    2. Dyslipidemia  Chronic and stable condition.  Patient will obtain labs today as she is currently fasting.  Refill of simvastatin 20 mg tablet 1 tablet once nightly sent to pharmacy.  Discussed appropriate diet lifestyle modifications.  Did congratulate patient on weight loss  - simvastatin (ZOCOR) 20 MG Tab; Take 1 Tablet by mouth every evening.  Dispense: 90 Tablet; Refill: 3        Any change or worsening of signs or symptoms, patient encouraged to follow-up or report to urgent care or emergency room for further evaluation. Patient verbalizes understanding and agrees.    Follow-Up: Return in about 6 months (around 7/31/2023) for Est  labs.      PLEASE NOTE: This dictation was created using voice recognition software. I have made every reasonable attempt to correct obvious errors, but I expect that there are errors of grammar and possibly content that I did not discover before finalizing the note.

## 2024-04-22 SDOH — HEALTH STABILITY: PHYSICAL HEALTH: ON AVERAGE, HOW MANY DAYS PER WEEK DO YOU ENGAGE IN MODERATE TO STRENUOUS EXERCISE (LIKE A BRISK WALK)?: 5 DAYS

## 2024-04-22 SDOH — ECONOMIC STABILITY: HOUSING INSECURITY
IN THE LAST 12 MONTHS, WAS THERE A TIME WHEN YOU DID NOT HAVE A STEADY PLACE TO SLEEP OR SLEPT IN A SHELTER (INCLUDING NOW)?: NO

## 2024-04-22 SDOH — HEALTH STABILITY: PHYSICAL HEALTH: ON AVERAGE, HOW MANY MINUTES DO YOU ENGAGE IN EXERCISE AT THIS LEVEL?: 30 MIN

## 2024-04-22 SDOH — HEALTH STABILITY: MENTAL HEALTH
STRESS IS WHEN SOMEONE FEELS TENSE, NERVOUS, ANXIOUS, OR CAN'T SLEEP AT NIGHT BECAUSE THEIR MIND IS TROUBLED. HOW STRESSED ARE YOU?: TO SOME EXTENT

## 2024-04-22 SDOH — ECONOMIC STABILITY: TRANSPORTATION INSECURITY
IN THE PAST 12 MONTHS, HAS THE LACK OF TRANSPORTATION KEPT YOU FROM MEDICAL APPOINTMENTS OR FROM GETTING MEDICATIONS?: NO

## 2024-04-22 SDOH — ECONOMIC STABILITY: FOOD INSECURITY: WITHIN THE PAST 12 MONTHS, THE FOOD YOU BOUGHT JUST DIDN'T LAST AND YOU DIDN'T HAVE MONEY TO GET MORE.: NEVER TRUE

## 2024-04-22 SDOH — ECONOMIC STABILITY: HOUSING INSECURITY: IN THE LAST 12 MONTHS, HOW MANY PLACES HAVE YOU LIVED?: 1

## 2024-04-22 SDOH — ECONOMIC STABILITY: INCOME INSECURITY: IN THE LAST 12 MONTHS, WAS THERE A TIME WHEN YOU WERE NOT ABLE TO PAY THE MORTGAGE OR RENT ON TIME?: NO

## 2024-04-22 SDOH — ECONOMIC STABILITY: FOOD INSECURITY: WITHIN THE PAST 12 MONTHS, YOU WORRIED THAT YOUR FOOD WOULD RUN OUT BEFORE YOU GOT MONEY TO BUY MORE.: NEVER TRUE

## 2024-04-22 SDOH — ECONOMIC STABILITY: TRANSPORTATION INSECURITY

## 2024-04-22 SDOH — ECONOMIC STABILITY: INCOME INSECURITY: HOW HARD IS IT FOR YOU TO PAY FOR THE VERY BASICS LIKE FOOD, HOUSING, MEDICAL CARE, AND HEATING?: NOT HARD AT ALL

## 2024-04-22 ASSESSMENT — SOCIAL DETERMINANTS OF HEALTH (SDOH)
HOW OFTEN DO YOU GET TOGETHER WITH FRIENDS OR RELATIVES?: TWICE A WEEK
DO YOU BELONG TO ANY CLUBS OR ORGANIZATIONS SUCH AS CHURCH GROUPS UNIONS, FRATERNAL OR ATHLETIC GROUPS, OR SCHOOL GROUPS?: NO
HOW HARD IS IT FOR YOU TO PAY FOR THE VERY BASICS LIKE FOOD, HOUSING, MEDICAL CARE, AND HEATING?: NOT HARD AT ALL
HOW OFTEN DO YOU GET TOGETHER WITH FRIENDS OR RELATIVES?: TWICE A WEEK
HOW OFTEN DO YOU ATTENT MEETINGS OF THE CLUB OR ORGANIZATION YOU BELONG TO?: NEVER
DO YOU BELONG TO ANY CLUBS OR ORGANIZATIONS SUCH AS CHURCH GROUPS UNIONS, FRATERNAL OR ATHLETIC GROUPS, OR SCHOOL GROUPS?: NO
HOW OFTEN DO YOU ATTENT MEETINGS OF THE CLUB OR ORGANIZATION YOU BELONG TO?: NEVER
IN A TYPICAL WEEK, HOW MANY TIMES DO YOU TALK ON THE PHONE WITH FAMILY, FRIENDS, OR NEIGHBORS?: MORE THAN THREE TIMES A WEEK
HOW MANY DRINKS CONTAINING ALCOHOL DO YOU HAVE ON A TYPICAL DAY WHEN YOU ARE DRINKING: 3 OR 4
HOW OFTEN DO YOU ATTEND CHURCH OR RELIGIOUS SERVICES?: NEVER
DO YOU BELONG TO ANY CLUBS OR ORGANIZATIONS SUCH AS CHURCH GROUPS UNIONS, FRATERNAL OR ATHLETIC GROUPS, OR SCHOOL GROUPS?: NO
WITHIN THE PAST 12 MONTHS, YOU WORRIED THAT YOUR FOOD WOULD RUN OUT BEFORE YOU GOT THE MONEY TO BUY MORE: NEVER TRUE
HOW OFTEN DO YOU GET TOGETHER WITH FRIENDS OR RELATIVES?: TWICE A WEEK
HOW OFTEN DO YOU ATTENT MEETINGS OF THE CLUB OR ORGANIZATION YOU BELONG TO?: NEVER
HOW OFTEN DO YOU HAVE A DRINK CONTAINING ALCOHOL: 2-3 TIMES A WEEK
HOW OFTEN DO YOU ATTEND CHURCH OR RELIGIOUS SERVICES?: NEVER
WITHIN THE PAST 12 MONTHS, YOU WORRIED THAT YOUR FOOD WOULD RUN OUT BEFORE YOU GOT THE MONEY TO BUY MORE: NEVER TRUE
HOW OFTEN DO YOU HAVE SIX OR MORE DRINKS ON ONE OCCASION: LESS THAN MONTHLY
HOW OFTEN DO YOU HAVE A DRINK CONTAINING ALCOHOL: 2-3 TIMES A WEEK
IN A TYPICAL WEEK, HOW MANY TIMES DO YOU TALK ON THE PHONE WITH FAMILY, FRIENDS, OR NEIGHBORS?: MORE THAN THREE TIMES A WEEK
HOW OFTEN DO YOU ATTENT MEETINGS OF THE CLUB OR ORGANIZATION YOU BELONG TO?: NEVER
HOW OFTEN DO YOU HAVE SIX OR MORE DRINKS ON ONE OCCASION: LESS THAN MONTHLY
HOW OFTEN DO YOU GET TOGETHER WITH FRIENDS OR RELATIVES?: TWICE A WEEK
DO YOU BELONG TO ANY CLUBS OR ORGANIZATIONS SUCH AS CHURCH GROUPS UNIONS, FRATERNAL OR ATHLETIC GROUPS, OR SCHOOL GROUPS?: NO
IN A TYPICAL WEEK, HOW MANY TIMES DO YOU TALK ON THE PHONE WITH FAMILY, FRIENDS, OR NEIGHBORS?: MORE THAN THREE TIMES A WEEK
HOW MANY DRINKS CONTAINING ALCOHOL DO YOU HAVE ON A TYPICAL DAY WHEN YOU ARE DRINKING: 3 OR 4
IN A TYPICAL WEEK, HOW MANY TIMES DO YOU TALK ON THE PHONE WITH FAMILY, FRIENDS, OR NEIGHBORS?: MORE THAN THREE TIMES A WEEK
HOW OFTEN DO YOU ATTEND CHURCH OR RELIGIOUS SERVICES?: NEVER
HOW HARD IS IT FOR YOU TO PAY FOR THE VERY BASICS LIKE FOOD, HOUSING, MEDICAL CARE, AND HEATING?: NOT HARD AT ALL
HOW OFTEN DO YOU ATTEND CHURCH OR RELIGIOUS SERVICES?: NEVER

## 2024-04-22 ASSESSMENT — LIFESTYLE VARIABLES
HOW OFTEN DO YOU HAVE A DRINK CONTAINING ALCOHOL: 2-3 TIMES A WEEK
SKIP TO QUESTIONS 9-10: 0
HOW OFTEN DO YOU HAVE A DRINK CONTAINING ALCOHOL: 2-3 TIMES A WEEK
HOW OFTEN DO YOU HAVE SIX OR MORE DRINKS ON ONE OCCASION: LESS THAN MONTHLY
HOW MANY STANDARD DRINKS CONTAINING ALCOHOL DO YOU HAVE ON A TYPICAL DAY: 3 OR 4
AUDIT-C TOTAL SCORE: 5
HOW MANY STANDARD DRINKS CONTAINING ALCOHOL DO YOU HAVE ON A TYPICAL DAY: 3 OR 4
HOW OFTEN DO YOU HAVE SIX OR MORE DRINKS ON ONE OCCASION: LESS THAN MONTHLY
AUDIT-C TOTAL SCORE: 5
SKIP TO QUESTIONS 9-10: 0

## 2024-04-23 ENCOUNTER — APPOINTMENT (OUTPATIENT)
Dept: MEDICAL GROUP | Facility: CLINIC | Age: 59
End: 2024-04-23
Payer: COMMERCIAL

## 2024-06-18 ENCOUNTER — APPOINTMENT (OUTPATIENT)
Dept: MEDICAL GROUP | Facility: CLINIC | Age: 59
End: 2024-06-18
Payer: COMMERCIAL

## 2024-06-18 VITALS
DIASTOLIC BLOOD PRESSURE: 76 MMHG | HEIGHT: 66 IN | TEMPERATURE: 97.6 F | RESPIRATION RATE: 18 BRPM | OXYGEN SATURATION: 98 % | BODY MASS INDEX: 26.11 KG/M2 | HEART RATE: 89 BPM | SYSTOLIC BLOOD PRESSURE: 164 MMHG | WEIGHT: 162.48 LBS

## 2024-06-18 DIAGNOSIS — E78.5 DYSLIPIDEMIA: ICD-10-CM

## 2024-06-18 DIAGNOSIS — Z12.31 ENCOUNTER FOR SCREENING MAMMOGRAM FOR BREAST CANCER: ICD-10-CM

## 2024-06-18 DIAGNOSIS — I10 ESSENTIAL HYPERTENSION: ICD-10-CM

## 2024-06-18 PROCEDURE — 99213 OFFICE O/P EST LOW 20 MIN: CPT | Performed by: PHYSICIAN ASSISTANT

## 2024-06-18 PROCEDURE — 3078F DIAST BP <80 MM HG: CPT | Performed by: PHYSICIAN ASSISTANT

## 2024-06-18 PROCEDURE — 3077F SYST BP >= 140 MM HG: CPT | Performed by: PHYSICIAN ASSISTANT

## 2024-06-18 RX ORDER — SIMVASTATIN 20 MG
20 TABLET ORAL EVERY EVENING
Qty: 90 TABLET | Refills: 3 | Status: SHIPPED | OUTPATIENT
Start: 2024-06-18

## 2024-06-18 RX ORDER — LISINOPRIL AND HYDROCHLOROTHIAZIDE 20; 12.5 MG/1; MG/1
1 TABLET ORAL 2 TIMES DAILY
Qty: 180 TABLET | Refills: 3 | Status: SHIPPED | OUTPATIENT
Start: 2024-06-18

## 2024-06-18 ASSESSMENT — FIBROSIS 4 INDEX: FIB4 SCORE: 1.09

## 2024-06-18 ASSESSMENT — PATIENT HEALTH QUESTIONNAIRE - PHQ9: CLINICAL INTERPRETATION OF PHQ2 SCORE: 0

## 2024-06-18 NOTE — PROGRESS NOTES
"cc:  hypertension    Subjective:     Elizabeth Suarez is a 58 y.o. female presenting for hypertension        History of Present Illness  The patient is a 8-year-old female who has hypertension and hyperlipidemia, here to establish care.    The patient requires a refill of her two antihypertensive medications, one of which is for cholesterol control, and lisinopril for hypertension. She reports no adverse effects from these medications. Over the past three months, she has abstained from medication, during which she experienced a few instances of needing to rest. She has been supplementing her diet with [employer]. She was previously on labetalol, which was discontinued.    Supplemental Information  She had COVID-19 in 11/2023. She had a colonoscopy recently.       Review of systems:  See above.   Denies any symptoms unless previously indicated.        Current Outpatient Medications:     simvastatin (ZOCOR) 20 MG Tab, Take 1 Tablet by mouth every evening., Disp: 90 Tablet, Rfl: 3    lisinopril-hydrochlorothiazide (PRINZIDE) 20-12.5 MG per tablet, Take 1 Tablet by mouth 2 times a day., Disp: 180 Tablet, Rfl: 3    aspirin EC (ECOTRIN) 81 MG TBEC, Take 81 mg by mouth every day., Disp: , Rfl:     Allergies, past medical history, past surgical history, family history, social history reviewed and updated    Objective:     Vitals: BP (!) 164/76 (BP Location: Left arm, Patient Position: Sitting, BP Cuff Size: Adult)   Pulse 89   Temp 36.4 °C (97.6 °F) (Temporal)   Resp 18   Ht 1.676 m (5' 6\")   Wt 73.7 kg (162 lb 7.7 oz)   LMP 06/20/2010   SpO2 98%   BMI 26.22 kg/m²   General: Alert, pleasant, NAD  EYES:   sunglasses  HENT:  Normocephalic.  External ears normal.  Neck supple.    Respiratory: Normal respiratory effort.    Abdomen: Not obese  Skin: Warm, dry, no rashes.  Musculoskeletal: Gait is normal.  Moves all extremities well.    Extremities: normal range of motion all extremities.   Neurological: No tremors, " sensation grossly intact,  CN2-12 intact.  Psych:  Affect/mood is normal, judgement is good, memory is intact, grooming is appropriate.      Assessment/Plan:     Elizabeth was seen today for establish care.    Diagnoses and all orders for this visit:    Dyslipidemia  -     Comp Metabolic Panel; Future  -     Lipid Profile; Future  -     simvastatin (ZOCOR) 20 MG Tab; Take 1 Tablet by mouth every evening.  Essential hypertension  -     Comp Metabolic Panel; Future  -     Lipid Profile; Future  -     lisinopril-hydrochlorothiazide (PRINZIDE) 20-12.5 MG per tablet; Take 1 Tablet by mouth 2 times a day.    Encounter for screening mammogram for breast cancer  -     MA-SCREENING MAMMO BILAT W/TOMOSYNTHESIS W/CAD; Future        Assessment & Plan  1. Dyslipidemia/hypertension.  The patient's medications have been refilled. However, she has been without medication for 3 months. A follow-up is recommended in 6 months. Laboratory tests have been ordered to further assess her condition. A mammogram has been ordered for her.    No follow-ups on file.    Please note that this dictation was created using voice recognition software. I have made every reasonable attempt to correct obvious errors, but expect that there are errors of grammar and possible content that I did not discover before finalizing note.

## 2024-06-19 DIAGNOSIS — E78.5 DYSLIPIDEMIA: ICD-10-CM

## 2024-06-19 RX ORDER — LABETALOL 200 MG/1
200 TABLET, FILM COATED ORAL 2 TIMES DAILY
Qty: 180 TABLET | Refills: 1 | Status: SHIPPED | OUTPATIENT
Start: 2024-06-19

## 2024-06-19 RX ORDER — LABETALOL 200 MG/1
200 TABLET, FILM COATED ORAL 2 TIMES DAILY
COMMUNITY
End: 2024-06-19 | Stop reason: SDUPTHER

## 2024-06-19 NOTE — TELEPHONE ENCOUNTER
Requested Prescriptions     Pending Prescriptions Disp Refills    labetalol (NORMODYNE) 200 MG Tab 180 Tablet 0     Sig: Take 1 Tablet by mouth 2 times a day.     LOV: 06/18/2024  Labs: 01/31/2023

## 2024-11-25 NOTE — TELEPHONE ENCOUNTER
Received request via: Pharmacy    Was the patient seen in the last year in this department? Yes    Does the patient have an active prescription (recently filled or refills available) for medication(s) requested? No    Pharmacy Name: [facility]     Does the patient have [facility] Plus and need 100-day supply? (This applies to ALL medications) Patient does not have SCP

## 2024-11-27 RX ORDER — LABETALOL 200 MG/1
200 TABLET, FILM COATED ORAL 2 TIMES DAILY
Qty: 180 TABLET | Refills: 1 | Status: SHIPPED | OUTPATIENT
Start: 2024-11-27

## 2025-01-09 ENCOUNTER — APPOINTMENT (OUTPATIENT)
Dept: RADIOLOGY | Facility: MEDICAL CENTER | Age: 60
End: 2025-01-09
Attending: PHYSICIAN ASSISTANT
Payer: COMMERCIAL

## 2025-02-11 ENCOUNTER — HOSPITAL ENCOUNTER (OUTPATIENT)
Dept: RADIOLOGY | Facility: MEDICAL CENTER | Age: 60
End: 2025-02-11
Attending: PHYSICIAN ASSISTANT
Payer: COMMERCIAL

## 2025-02-11 DIAGNOSIS — Z12.31 ENCOUNTER FOR SCREENING MAMMOGRAM FOR BREAST CANCER: ICD-10-CM

## 2025-02-11 PROCEDURE — 77067 SCR MAMMO BI INCL CAD: CPT

## 2025-02-13 ENCOUNTER — RESULTS FOLLOW-UP (OUTPATIENT)
Dept: MEDICAL GROUP | Facility: CLINIC | Age: 60
End: 2025-02-13

## 2025-06-05 DIAGNOSIS — E78.5 DYSLIPIDEMIA: ICD-10-CM

## 2025-06-05 DIAGNOSIS — I10 ESSENTIAL HYPERTENSION: ICD-10-CM

## 2025-06-05 RX ORDER — LISINOPRIL AND HYDROCHLOROTHIAZIDE 12.5; 2 MG/1; MG/1
1 TABLET ORAL 2 TIMES DAILY
Qty: 60 TABLET | Refills: 0 | Status: SHIPPED | OUTPATIENT
Start: 2025-06-05 | End: 2025-06-25 | Stop reason: SDUPTHER

## 2025-06-05 RX ORDER — SIMVASTATIN 20 MG
20 TABLET ORAL EVERY EVENING
Qty: 30 TABLET | Refills: 0 | Status: SHIPPED | OUTPATIENT
Start: 2025-06-05 | End: 2025-06-25 | Stop reason: SDUPTHER

## 2025-06-05 RX ORDER — LABETALOL 200 MG/1
200 TABLET, FILM COATED ORAL 2 TIMES DAILY
Qty: 30 TABLET | Refills: 0 | Status: SHIPPED | OUTPATIENT
Start: 2025-06-05 | End: 2025-06-25 | Stop reason: SDUPTHER

## 2025-06-05 NOTE — TELEPHONE ENCOUNTER
Requested Prescriptions     Pending Prescriptions Disp Refills    simvastatin (ZOCOR) 20 MG Tab [Pharmacy Med Name: Simvastatin 20 MG Oral Tablet] 90 Tablet 0     Sig: TAKE 1 TABLET BY MOUTH ONCE DAILY IN THE EVENING    lisinopril-hydrochlorothiazide (PRINZIDE) 20-12.5 MG per tablet [Pharmacy Med Name: Lisinopril-hydroCHLOROthiazide 20-12.5 MG Oral Tablet] 180 Tablet 0     Sig: Take 1 tablet by mouth twice daily    labetalol (NORMODYNE) 200 MG Tab [Pharmacy Med Name: Labetalol HCl 200 MG Oral Tablet] 180 Tablet 0     Sig: Take 1 tablet by mouth twice daily      Last office visit: 6/18/24  Last lab: 1/31/23

## 2025-06-24 ENCOUNTER — APPOINTMENT (OUTPATIENT)
Dept: MEDICAL GROUP | Facility: CLINIC | Age: 60
End: 2025-06-24
Payer: COMMERCIAL

## 2025-06-25 ENCOUNTER — OFFICE VISIT (OUTPATIENT)
Dept: MEDICAL GROUP | Facility: CLINIC | Age: 60
End: 2025-06-25
Payer: COMMERCIAL

## 2025-06-25 VITALS
WEIGHT: 174.16 LBS | DIASTOLIC BLOOD PRESSURE: 84 MMHG | SYSTOLIC BLOOD PRESSURE: 132 MMHG | HEART RATE: 87 BPM | TEMPERATURE: 97.7 F | HEIGHT: 66 IN | BODY MASS INDEX: 27.99 KG/M2 | RESPIRATION RATE: 18 BRPM | OXYGEN SATURATION: 96 %

## 2025-06-25 DIAGNOSIS — E78.5 DYSLIPIDEMIA: ICD-10-CM

## 2025-06-25 DIAGNOSIS — Z13.21 ENCOUNTER FOR VITAMIN DEFICIENCY SCREENING: ICD-10-CM

## 2025-06-25 DIAGNOSIS — Z87.2 H/O INGROWN NAIL: ICD-10-CM

## 2025-06-25 DIAGNOSIS — I10 ESSENTIAL HYPERTENSION: Primary | ICD-10-CM

## 2025-06-25 DIAGNOSIS — Z23 NEED FOR VACCINATION: ICD-10-CM

## 2025-06-25 DIAGNOSIS — Q71.813 BRACHYDACTYLY OF FINGERS OF BOTH HANDS: ICD-10-CM

## 2025-06-25 RX ORDER — LISINOPRIL AND HYDROCHLOROTHIAZIDE 12.5; 2 MG/1; MG/1
1 TABLET ORAL 2 TIMES DAILY
Qty: 180 TABLET | Refills: 2 | Status: SHIPPED | OUTPATIENT
Start: 2025-06-25

## 2025-06-25 RX ORDER — LABETALOL 200 MG/1
200 TABLET, FILM COATED ORAL 2 TIMES DAILY
Qty: 90 TABLET | Refills: 2 | Status: SHIPPED | OUTPATIENT
Start: 2025-06-25

## 2025-06-25 RX ORDER — SIMVASTATIN 20 MG
20 TABLET ORAL EVERY EVENING
Qty: 90 TABLET | Refills: 2 | Status: SHIPPED | OUTPATIENT
Start: 2025-06-25

## 2025-06-25 RX ORDER — SULFAMETHOXAZOLE AND TRIMETHOPRIM 800; 160 MG/1; MG/1
1 TABLET ORAL 2 TIMES DAILY
Qty: 20 TABLET | Refills: 0 | Status: SHIPPED | OUTPATIENT
Start: 2025-06-25

## 2025-06-25 ASSESSMENT — PATIENT HEALTH QUESTIONNAIRE - PHQ9: CLINICAL INTERPRETATION OF PHQ2 SCORE: 0

## 2025-06-25 NOTE — PROGRESS NOTES
"cc:  hypertension    Subjective:     Elizabeth Suarez is a 59 y.o. female presenting for hypertension        History of Present Illness  The patient is a 59-year-old female who presents to the office today for medication refills. It has been over a year since she was last seen. She has hypertension and dyslipidemia and is due for routine lab work.    She reports that her blood pressure has been well-controlled, as monitored by her watch, and has not experienced any symptoms related to her hypertension. She is currently on lisinopril, hydrochlorothiazide, and labetalol.    She has an ingrown nail on her finger, which she has been managing by soaking and filing down. The nail does not appear to be infected. She has not sought genetic evaluation for this issue. She has previously experienced similar issues with her big toes, which would resolve spontaneously after a few days. She also reports that her thumb nails are unusually hard and require trimming with scissors. She has not had any prior issues with her thumbs until now.    She has added magnesium to her diet, which has been helping with joint cramps in her knees. 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 She has experienced diarrhea a few times, which she attributes to the magnesium.    She has completed her mammogram and colonoscopy.    FAMILY HISTORY  - Father had issues with his toenails being very hard and requiring scissors to cut them       Review of systems:  See above.   Denies any symptoms unless previously indicated.      Current Medications[1]    Allergies, past medical history, past surgical history, family history, social history reviewed and updated    Objective:     Vitals: /84 (BP Location: Left arm, Patient Position: Sitting, BP Cuff Size: Adult)   Pulse 87   Temp 36.5 °C (97.7 °F) (Temporal)   Resp 18   Ht 1.676 m (5' 6\")   Wt 79 kg (174 lb 2.6 oz)   LMP 06/20/2010   SpO2 " 96%   BMI 28.11 kg/m²   General: Alert, pleasant, NAD  EYES:   PERRL, EOMI, no icterus or pallor.  Conjunctivae and lids normal.   HENT:  Normocephalic.  External ears normal.   Neck supple.    Respiratory: Normal respiratory effort.    Abdomen: Not obese  Skin: Warm, dry, no rashes.  Musculoskeletal: Gait is normal.  Moves all extremities well.  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  Extremities: normal range of motion all extremities.   Neurological: No tremors, sensation grossly intact, CN2-12 intact.  Psych:  Affect/mood is normal, judgement is good, memory is intact, grooming is appropriate.    Physical Exam  Other: Brachydactyly of both thumbs, ingrown nails of both thumbs, possible fungal infection in thumbs       Results         Assessment/Plan:     Elizabeth was seen today for medication refill.    Diagnoses and all orders for this visit:    Essential hypertension  -     Lipid Profile; Future  -     Comp Metabolic Panel; Future  -     TSH WITH REFLEX TO FT4; Future  -     labetalol (NORMODYNE) 200 MG Tab; Take 1 Tablet by mouth 2 times a day.  -     lisinopril-hydrochlorothiazide (PRINZIDE) 20-12.5 MG per tablet; Take 1 Tablet by mouth 2 times a day.    Dyslipidemia  -     Lipid Profile; Future  -     Comp Metabolic Panel; Future  -     TSH WITH REFLEX TO FT4; Future  -     simvastatin (ZOCOR) 20 MG Tab; Take 1 Tablet by mouth every evening.    Encounter for vitamin deficiency screening  -     VITAMIN D,25 HYDROXY (DEFICIENCY); Future    Need for vaccination  -     Pneumococcal Conjugate Vaccine 20-Valent (6 wks+)    Brachydactyly of fingers of both hands  Comments:  both thumbs    H/O ingrown nail    Other orders  -     sulfamethoxazole-trimethoprim (BACTRIM DS) 800-160 MG tablet; Take 1 Tablet by mouth 2 times a day.        Assessment & Plan  1. Hypertension.  - Blood pressure is generally well-controlled, though the systolic number was slightly elevated today.  - No medication adjustment is  deemed necessary at this time.  - Current medications, including lisinopril, hydrochlorothiazide, and labetalol, have been refilled.  - Routine lab work ordered to evaluate further.    2. Hyperlipidemia.  - Current medication, simvastatin, has been refilled.  - Labs have been ordered to evaluate her condition further.  - She will be notified of the results upon receipt.  - Routine lab work ordered to evaluate further.    3. Vitamin deficiency screening.  - Labs have been ordered to include a vitamin D level assessment.  - Routine lab work ordered to evaluate further.  - Will notify patient of results when received.  - Vitamin D level included in lab orders.    4. Brachydactyly of both thumbs/ingrown nail.  - Presents with genetic brachydactyly of both thumbs and ingrown nails on both thumbs.  - Referral to dermatology was offered but declined.  - Genetic screening was suggested but declined.  - Bactrim 800 mg/160 mg twice daily for 10 days prescribed as a precautionary measure against potential infection from the ingrown nail.    5. Health maintenance.  - Due for routine lab work.  - Completed mammogram and colonoscopy.  - Pneumonia vaccine administered today.  - Routine lab work ordered to evaluate further.    No follow-ups on file.    Please note that this dictation was created using voice recognition software. I have made every reasonable attempt to correct obvious errors, but expect that there are errors of grammar and possible content that I did not discover before finalizing note.               [1]   Current Outpatient Medications:     labetalol (NORMODYNE) 200 MG Tab, Take 1 Tablet by mouth 2 times a day., Disp: 90 Tablet, Rfl: 2    simvastatin (ZOCOR) 20 MG Tab, Take 1 Tablet by mouth every evening., Disp: 90 Tablet, Rfl: 2    lisinopril-hydrochlorothiazide (PRINZIDE) 20-12.5 MG per tablet, Take 1 Tablet by mouth 2 times a day., Disp: 180 Tablet, Rfl: 2    sulfamethoxazole-trimethoprim (BACTRIM DS) 800-160  MG tablet, Take 1 Tablet by mouth 2 times a day., Disp: 20 Tablet, Rfl: 0    aspirin EC (ECOTRIN) 81 MG TBEC, Take 81 mg by mouth every day., Disp: , Rfl:

## 2025-08-18 DIAGNOSIS — I10 ESSENTIAL HYPERTENSION: ICD-10-CM

## 2025-08-19 RX ORDER — LISINOPRIL AND HYDROCHLOROTHIAZIDE 12.5; 2 MG/1; MG/1
1 TABLET ORAL 2 TIMES DAILY
Qty: 180 TABLET | Refills: 0 | Status: SHIPPED | OUTPATIENT
Start: 2025-08-19